# Patient Record
Sex: MALE | Race: BLACK OR AFRICAN AMERICAN | Employment: UNEMPLOYED | URBAN - METROPOLITAN AREA
[De-identification: names, ages, dates, MRNs, and addresses within clinical notes are randomized per-mention and may not be internally consistent; named-entity substitution may affect disease eponyms.]

---

## 2018-07-27 ENCOUNTER — ED HISTORICAL/CONVERTED ENCOUNTER (OUTPATIENT)
Dept: OTHER | Age: 53
End: 2018-07-27

## 2022-10-17 ENCOUNTER — HOSPITAL ENCOUNTER (OUTPATIENT)
Age: 57
Discharge: HOME | End: 2022-10-17
Attending: INTERNAL MEDICINE | Admitting: INTERNAL MEDICINE
Payer: COMMERCIAL

## 2022-10-17 VITALS
DIASTOLIC BLOOD PRESSURE: 83 MMHG | HEIGHT: 68 IN | HEART RATE: 67 BPM | TEMPERATURE: 98.1 F | OXYGEN SATURATION: 100 % | SYSTOLIC BLOOD PRESSURE: 133 MMHG | WEIGHT: 150 LBS | BODY MASS INDEX: 22.73 KG/M2 | RESPIRATION RATE: 16 BRPM

## 2022-10-17 DIAGNOSIS — R07.89 OTHER CHEST PAIN: ICD-10-CM

## 2022-10-17 PROBLEM — R07.9 CHEST PAIN: Status: ACTIVE | Noted: 2022-10-17

## 2022-10-17 LAB
ALBUMIN SERPL-MCNC: 4.4 G/DL (ref 3.5–5)
ALBUMIN/GLOB SERPL: 1.2 {RATIO} (ref 1.1–2.2)
ALP SERPL-CCNC: 76 U/L (ref 45–117)
ALT SERPL-CCNC: 20 U/L (ref 12–78)
ANION GAP SERPL CALC-SCNC: 3 MMOL/L (ref 5–15)
AST SERPL W P-5'-P-CCNC: ABNORMAL U/L (ref 15–37)
BASOPHILS # BLD: 0 K/UL (ref 0–0.1)
BASOPHILS NFR BLD: 1 % (ref 0–1)
BILIRUB SERPL-MCNC: 0.5 MG/DL (ref 0.2–1)
BUN SERPL-MCNC: 7 MG/DL (ref 6–20)
BUN/CREAT SERPL: 8 (ref 12–20)
CA-I BLD-MCNC: 9.5 MG/DL (ref 8.5–10.1)
CHLORIDE SERPL-SCNC: 103 MMOL/L (ref 97–108)
CO2 SERPL-SCNC: 32 MMOL/L (ref 21–32)
CREAT SERPL-MCNC: 0.83 MG/DL (ref 0.7–1.3)
DIFFERENTIAL METHOD BLD: NORMAL
EOSINOPHIL # BLD: 0.1 K/UL (ref 0–0.4)
EOSINOPHIL NFR BLD: 1 % (ref 0–7)
ERYTHROCYTE [DISTWIDTH] IN BLOOD BY AUTOMATED COUNT: 13.6 % (ref 11.5–14.5)
GLOBULIN SER CALC-MCNC: 3.6 G/DL (ref 2–4)
GLUCOSE SERPL-MCNC: 84 MG/DL (ref 65–100)
HCT VFR BLD AUTO: 43.8 % (ref 36.6–50.3)
HGB BLD-MCNC: 14.2 G/DL (ref 12.1–17)
IMM GRANULOCYTES # BLD AUTO: 0 K/UL (ref 0–0.04)
IMM GRANULOCYTES NFR BLD AUTO: 0 % (ref 0–0.5)
LYMPHOCYTES # BLD: 1.3 K/UL (ref 0.8–3.5)
LYMPHOCYTES NFR BLD: 32 % (ref 12–49)
MCH RBC QN AUTO: 29 PG (ref 26–34)
MCHC RBC AUTO-ENTMCNC: 32.4 G/DL (ref 30–36.5)
MCV RBC AUTO: 89.4 FL (ref 80–99)
MONOCYTES # BLD: 0.3 K/UL (ref 0–1)
MONOCYTES NFR BLD: 6 % (ref 5–13)
NEUTS SEG # BLD: 2.5 K/UL (ref 1.8–8)
NEUTS SEG NFR BLD: 60 % (ref 32–75)
NRBC # BLD: 0 K/UL (ref 0–0.01)
NRBC BLD-RTO: 0 PER 100 WBC
PLATELET # BLD AUTO: 170 K/UL (ref 150–400)
PMV BLD AUTO: 11.8 FL (ref 8.9–12.9)
POTASSIUM SERPL-SCNC: ABNORMAL MMOL/L (ref 3.5–5.1)
PROT SERPL-MCNC: 8 G/DL (ref 6.4–8.2)
RBC # BLD AUTO: 4.9 M/UL (ref 4.1–5.7)
SODIUM SERPL-SCNC: 138 MMOL/L (ref 136–145)
WBC # BLD AUTO: 4.1 K/UL (ref 4.1–11.1)

## 2022-10-17 PROCEDURE — 36415 COLL VENOUS BLD VENIPUNCTURE: CPT

## 2022-10-17 PROCEDURE — 76210000027 HC REC RM PH II 3.5 TO 4 HR: Performed by: INTERNAL MEDICINE

## 2022-10-17 PROCEDURE — 93458 L HRT ARTERY/VENTRICLE ANGIO: CPT | Performed by: INTERNAL MEDICINE

## 2022-10-17 PROCEDURE — 77030004550 HC CATH ANGI DX PRF MRTM -B: Performed by: INTERNAL MEDICINE

## 2022-10-17 PROCEDURE — 80053 COMPREHEN METABOLIC PANEL: CPT

## 2022-10-17 PROCEDURE — 77030004522 HC CATH ANGI DX EXPO BSC -A: Performed by: INTERNAL MEDICINE

## 2022-10-17 PROCEDURE — 74011250636 HC RX REV CODE- 250/636: Performed by: INTERNAL MEDICINE

## 2022-10-17 PROCEDURE — 99152 MOD SED SAME PHYS/QHP 5/>YRS: CPT | Performed by: INTERNAL MEDICINE

## 2022-10-17 PROCEDURE — 85025 COMPLETE CBC W/AUTO DIFF WBC: CPT

## 2022-10-17 PROCEDURE — C1760 CLOSURE DEV, VASC: HCPCS | Performed by: INTERNAL MEDICINE

## 2022-10-17 PROCEDURE — 74011000250 HC RX REV CODE- 250: Performed by: INTERNAL MEDICINE

## 2022-10-17 PROCEDURE — 74011000636 HC RX REV CODE- 636: Performed by: INTERNAL MEDICINE

## 2022-10-17 PROCEDURE — C1769 GUIDE WIRE: HCPCS | Performed by: INTERNAL MEDICINE

## 2022-10-17 PROCEDURE — C1894 INTRO/SHEATH, NON-LASER: HCPCS | Performed by: INTERNAL MEDICINE

## 2022-10-17 PROCEDURE — 99153 MOD SED SAME PHYS/QHP EA: CPT | Performed by: INTERNAL MEDICINE

## 2022-10-17 PROCEDURE — 76210000006 HC OR PH I REC 0.5 TO 1 HR: Performed by: INTERNAL MEDICINE

## 2022-10-17 RX ORDER — DIPHENHYDRAMINE HYDROCHLORIDE 50 MG/ML
INJECTION, SOLUTION INTRAMUSCULAR; INTRAVENOUS AS NEEDED
Status: DISCONTINUED | OUTPATIENT
Start: 2022-10-17 | End: 2022-10-17 | Stop reason: HOSPADM

## 2022-10-17 RX ORDER — TIZANIDINE HYDROCHLORIDE 4 MG/1
4 CAPSULE, GELATIN COATED ORAL 3 TIMES DAILY
COMMUNITY

## 2022-10-17 RX ORDER — MIDAZOLAM HYDROCHLORIDE 1 MG/ML
INJECTION INTRAMUSCULAR; INTRAVENOUS AS NEEDED
Status: DISCONTINUED | OUTPATIENT
Start: 2022-10-17 | End: 2022-10-17 | Stop reason: HOSPADM

## 2022-10-17 RX ORDER — SODIUM CHLORIDE 0.9 % (FLUSH) 0.9 %
5-40 SYRINGE (ML) INJECTION AS NEEDED
Status: DISCONTINUED | OUTPATIENT
Start: 2022-10-17 | End: 2022-10-17 | Stop reason: HOSPADM

## 2022-10-17 RX ORDER — PREDNISONE 10 MG/1
10 TABLET ORAL
COMMUNITY

## 2022-10-17 RX ORDER — FINASTERIDE 5 MG/1
5 TABLET, FILM COATED ORAL DAILY
COMMUNITY

## 2022-10-17 RX ORDER — ISOSORBIDE MONONITRATE 30 MG/1
30 TABLET, EXTENDED RELEASE ORAL DAILY
COMMUNITY

## 2022-10-17 RX ORDER — IBUPROFEN 200 MG
CAPSULE ORAL
COMMUNITY

## 2022-10-17 RX ORDER — HEPARIN SODIUM 200 [USP'U]/100ML
INJECTION, SOLUTION INTRAVENOUS
Status: COMPLETED | OUTPATIENT
Start: 2022-10-17 | End: 2022-10-17

## 2022-10-17 RX ORDER — FENTANYL CITRATE 50 UG/ML
INJECTION, SOLUTION INTRAMUSCULAR; INTRAVENOUS AS NEEDED
Status: DISCONTINUED | OUTPATIENT
Start: 2022-10-17 | End: 2022-10-17 | Stop reason: HOSPADM

## 2022-10-17 RX ORDER — SODIUM CHLORIDE 0.9 % (FLUSH) 0.9 %
5-40 SYRINGE (ML) INJECTION EVERY 8 HOURS
Status: DISCONTINUED | OUTPATIENT
Start: 2022-10-17 | End: 2022-10-17 | Stop reason: HOSPADM

## 2022-10-17 RX ORDER — LIDOCAINE HYDROCHLORIDE 10 MG/ML
INJECTION INFILTRATION; PERINEURAL AS NEEDED
Status: DISCONTINUED | OUTPATIENT
Start: 2022-10-17 | End: 2022-10-17 | Stop reason: HOSPADM

## 2022-10-17 RX ORDER — SODIUM CHLORIDE 9 MG/ML
50 INJECTION, SOLUTION INTRAVENOUS CONTINUOUS
Status: DISCONTINUED | OUTPATIENT
Start: 2022-10-17 | End: 2022-10-17 | Stop reason: HOSPADM

## 2022-10-17 RX ADMIN — SODIUM CHLORIDE 50 ML/HR: 9 INJECTION, SOLUTION INTRAVENOUS at 07:59

## 2022-10-17 NOTE — Clinical Note
TRANSFER - OUT REPORT:     Verbal report given to: nilsa macias , (at bedside). Report consisted of patient's Situation, Background, Assessment and   Recommendations(SBAR). Opportunity for questions and clarification was provided. Patient transported with a Registered Nurse. Patient transported to: [facility].

## 2022-10-17 NOTE — DISCHARGE INSTRUCTIONS
[facility]  Cardiac Catheterization Department  [address], [address]  [phone]        Coronary Angiogram: What to Expect at [address] [facility]  A coronary angiogram is a test to examine the large blood vessels of your heart (coronary arteries). The doctor inserted a thin, flexible tube (catheter into a blood vessel in your groin or wrist.  Your groin or wrist may have a bruise and feel sore for a few days after the procedure. You can do light activities around the house. But do not do anything strenuous until your doctor says it is ok. This may be for several days. This care sheet gives you a general idea about how long it will take for you to recover. But each person recovers at a different pace. Follow the steps below to feel better as quickly as possible. How can you care for yourself at home? Activity  If the doctor gave you a sedative: For 24 hours, don't do anything that requires attention to detail, such as going to work, making important decisions, or signing any legal documents. It takes time for the medicine's effects to completely wear off. For your safety, do not drive or operate any machinery that could be dangerous. Wait until the medicine wears off and you can think clearly and react easily. Do not do any strenuous exercise and do not lift, pull, or push anything heavier than 5 pounds (approximately the weight of 1 gallon of milk) until your doctor says it is ok. This may be for several days. You can walk around the house and do light activity, such as cooking. If the catheter was placed in your groin and you must use stairs, just go up and down slowly in as few trips as possible for the first couple of days. If the catheter was placed in your wrist, do not bend your wrist deeply for the first couple of days. A soft wrist-splint may be placed on your wrist as a reminder following the procedure.   Be careful using your hand to get into and out of a chair or bed and when opening doors. Get regular exercise, after being cleared by your cardiologist.  Walking may be a good choice. Try for at least 30 minutes on most days of the week. If you smoke or use smokeless tobacco products, including vaping products, it is extremely important that you quit using these products. Please ask you nurse or doctor for more information about smoking cessation. Diet  Drink plenty of fluids to help you body flush out the dye. If you have kidney, heart, or liver disease and have to limit fluids, talk to your doctor before increasing the amount of fluids you drink. Keep eating a heart-healthy diet that has lots of fruits, vegetables, and whole grains. If you have not been eating this way, talk to your doctor. You also may want to talk to a dietician. This expert can help you to learn about healthy foods and plan meals. Medicines  Your doctor will tell you if and when you can restart your medicines. You will also be given instructions about taking any new medicines. Take these medicines as prescribed. Continue taking your cholesterol lowering medications (statins), if you have been prescribed this. You doctor may prescribe a blood-thinning medicine like aspirin or clopidogrel (Plavix), plasugrel (Effient), or ticagrelor (Brilinta). If you had angioplasty or a stent placement, you must continue aspirin and Plavix, Effient, or Brilinta. It is very important that you take these medicines exactly as directed to keep the coronary artery open and reduce your risk of heart attack. Be safe with medicines. Call your doctor if you think you are having a problem with taking or obtaining your medicines, notify your doctor immediately. You cannot afford to miss your medications. Do not stop taking these medications without speaking to your cardiologist first.   Do not strain to have a bowel movement.   Ask your doctor if you feel you may need a stool softener of laxative. Care of the catheter site  For 1 or 2 days, keep a bandage over the spot where the catheter(s) was inserted. The bandage probably will fall off in this time. Put ice or a cold pack on the area for 10 to 20 minutes at a time to help with soreness of swelling. Place a thin cloth between the ice and your skin. You may shower 24 to 48 hours after the procedure, if your doctor says it is okay. Pat the incision dry with a clean towel afterwards. Do not soak the catheter site until it is healed. Don't take a bath for 1 week, or until your doctor tells you it is okay to do so. The use of lotions and powders is not recommended at the site until it is completely healed. Watch for bleeding from the site. A small amount of blood (up to the size of a quarter) on the bandage can be normal.  If you cough, sneeze, or laugh vigorously, apply direct pressure with your hand over the catheter site. If you notice a little bit of blood from the catheter site (similar to a paper cut or shaving nick), lie down and press firmly on the area for 15 minutes to try and make it stop bleeding. If the bleeding does not stop, call your doctor or seek immediate medical care. If you notice heavy bleeding at the site that has either saturated the bandage or is squirting, lie down, press firmly on the site, and have someone call 911. Follow-up care is a key part of your treatment and safety. Be sure to make and go to all appointments and call your doctor if you are having problems. It's also a good idea to know your test results and keep a list of medicines you take. When should you call for help? Call 911 anytime you think you may need emergency care. For example, call if:  You passed out (lost consciousness). You have severe trouble breathing. You have sudden chest pain and shortness of breath, or you cough up blood.   Call 911 if you have symptoms of a heart attack, such as:  Chest Pain, pressure, squeezing, or burning. Sweating. Shortness of breath or difficulty breathing. Nausea or vomiting. Jaw pain, shoulder pain, or arm pain in one or both sides. Feelings of fullness. Excessive fatigue or weakness. New onset anxiety. New onset of upper back pain. Dizziness or lightheadedness. A fast or uneven pulse. Call 911 if you have been diagnosed with angina, and you have symptoms that do not go away with rest or are not getting better within 5 minutes of taking a dose of your nitroglycerin. After you call 911, the  may tell you to chew 1 adult-strength (325 mg) of 2 to 4 low-dose aspirin (81 mg). Wait for ambulance. Do not drive yourself. Call your doctor now or seek immediate medical care if:  You are bleeding from the area where the catheter was inserted. You have a fast-growing, painful lump at the catheter site. You have signs of infection, such as  Increased pain, swelling, warmth or redness at the catheter site. Red streaks leading from the catheter site. Pus draining from the catheter site. A fever. Your leg or hand is painful, looks blue, or feels cold, numb, or tingly. Watch closely for changes in your health and be sure to contact your doctor if you have any problems.

## 2022-10-17 NOTE — Clinical Note
Contrast Dose Calculator:   Patient's age: 61.   Patient's sex: Male. Patient weight (kg) = 68. Creatinine level (mg/dL) = 0.83. Creatinine clearance (mL/min): 93.31. Contrast concentration (mg/mL) = 370. MACD = 300 mL. Max Contrast dose per Creatinine Cl calculator = 209.94 mL.

## 2022-10-17 NOTE — CONSULTS
Consult    NAME: Darren Galaviz   :  1965   MRN:  [id number]     Date/Time:  10/17/2022 9:26 AM    Patient PCP: None, Other, MD  ________________________________________________________________________      Subjective:   CHIEF COMPLAINT: Chest tightness. HISTORY OF PRESENT ILLNESS:   Patient with symptoms of chest tightness which is progressively deteriorating over a span of 1 year. He describes in the retrosternal area and gets relieved with rest.  He did not had any nausea or vomiting but he got more and more concerned and he stopped doing any activity. He has a low pulse and dyslipidemia and hypertension and his symptoms were concerning for angina and cardiac catheterization has been scheduled. Denied any history of heart attack. Past Medical History:   Diagnosis Date    Autoimmune disease ([address]     Lupus    BPH (benign prostatic hyperplasia)     Psychiatric disorder     Anxiety and depression      History reviewed. No pertinent surgical history. Allergies   Allergen Reactions    Penicillin Other (comments)     Pt stated he got \"thrush\" the last time he had the med in       Meds:  See below  Social History     Tobacco Use    Smoking status: Former     Types: Cigarettes    Smokeless tobacco: Never   Substance Use Topics    Alcohol use: Not Currently   Continues to smoke half pack a day but quit 20 years ago and used to drink heavily but he has quit many years ago and he used marijuana heroin cocaine and IV drugs and she had and he said he used everything available to him but he is incarcerated and he does not use any longer for quite some time. History reviewed. No pertinent family history. FAMILY HISTORY: Mother is 78years old and apparently in reasonable health and he has no idea about his father. PERSONAL HISTORY : Patient is single and has no children and has been incarcerated since very young age and he did not work ever.       REVIEW OF SYSTEMS:     [] Unable to obtain  ROS due to ---   [x]         Total of 12 systems reviewed as follows:    Constitutional: negative fever, negative chills, negative weight loss  Eyes:   negative visual changes  ENT:   negative sore throat, tongue or lip swelling  Respiratory:  negative cough, positive for dyspnea on exertion  Cards: Significant for for chest pain, denied palpitations, lower extremity edema  GI:   negative for nausea, vomiting, diarrhea, and abdominal pain  Genitourinary: negative for frequency, dysuria  Integument:  negative for rash   Hematologic:  negative for easy bruising and gum/nose bleeding  Musculoskel: negative for myalgias,  back pain  Neurological:  negative for headaches, dizziness, vertigo, weakness  Behavl/Psych: negative for feelings of anxiety, depression     Pertinent Positives include :    Objective:      Physical Exam:    Last 24hrs VS reviewed since prior progress note. Most recent are:    Visit Vitals  BP (!) 151/85 (BP 1 Location: Left upper arm, BP Patient Position: At rest)   Pulse 66   Temp 98.7 °F (37.1 °C)   Resp 16   Ht 5' 8\" (1.727 m)   Wt 68 kg (150 lb)   SpO2 100%   BMI 22.81 kg/m²     No intake or output data in the 24 hours ending 10/17/22 0926     General Appearance: Well developed, well nourished, alert & oriented x 3,    no acute distress. Ears/Nose/Mouth/Throat: Pupils equal and round, Hearing grossly normal.  Neck: Supple. JVP within normal limits. Carotids good upstrokes, with no bruit. Chest: Lungs clear to auscultation bilaterally. Cardiovascular: Regular rate and rhythm, S1S2 normal, no murmur, rubs, gallops. Abdomen: Soft, non-tender, bowel sounds are active. No organomegaly. Extremities: No edema bilaterally. Femoral pulses +2, Distal Pulses +1. Skin: Warm and dry. Neuro: CN II-XII grossly intact, Strength and sensation grossly intact. []         Post-cath site without hematoma, bruit, tenderness, or thrill. Distal pulses intact.     Data:      Telemetry: Normal sinus rhythm    EKG:  []  No new EKG for review. Prior to Admission medications    Medication Sig Start Date End Date Taking? Authorizing Provider   finasteride (PROSCAR) 5 mg tablet Take 5 mg by mouth daily. Yes Provider, Historical   isosorbide mononitrate ER (IMDUR) 30 mg tablet Take 30 mg by mouth daily. Yes Provider, Historical   predniSONE (DELTASONE) 10 mg tablet Take 10 mg by mouth daily (with breakfast). Yes Provider, Historical   ibuprofen 200 mg cap Take  by mouth. Yes Provider, Historical   tiZANidine (ZANAFLEX) 4 mg capsule Take 4 mg by mouth three (3) times daily. Yes Provider, Historical       Recent Results (from the past 24 hour(s))   METABOLIC PANEL, COMPREHENSIVE    Collection Time: 10/17/22  7:54 AM   Result Value Ref Range    Sodium 138 136 - 145 mmol/L    Potassium Hemolyzed, recollect requested 3.5 - 5.1 mmol/L    Chloride 103 97 - 108 mmol/L    CO2 32 21 - 32 mmol/L    Anion gap 3 (L) 5 - 15 mmol/L    Glucose 84 65 - 100 mg/dL    BUN 7 6 - 20 mg/dL    Creatinine 0.83 0.70 - 1.30 mg/dL    BUN/Creatinine ratio 8 (L) 12 - 20      eGFR >60 >60 ml/min/1.73m2    Calcium 9.5 8.5 - 10.1 mg/dL    Bilirubin, total 0.5 0.2 - 1.0 mg/dL    AST (SGOT) Hemolyzed, recollect requested 15 - 37 U/L    ALT (SGPT) 20 12 - 78 U/L    Alk. phosphatase 76 45 - 117 U/L    Protein, total 8.0 6.4 - 8.2 g/dL    Albumin 4.4 3.5 - 5.0 g/dL    Globulin 3.6 2.0 - 4.0 g/dL    A-G Ratio 1.2 1.1 - 2.2     CBC WITH AUTOMATED DIFF    Collection Time: 10/17/22  7:54 AM   Result Value Ref Range    WBC 4.1 4.1 - 11.1 K/uL    RBC 4.90 4. 10 - 5.70 M/uL    HGB 14.2 12.1 - 17.0 g/dL    HCT 43.8 36.6 - 50.3 %    MCV 89.4 80.0 - 99.0 FL    MCH 29.0 26.0 - 34.0 PG    MCHC 32.4 30.0 - 36.5 g/dL    RDW 13.6 11.5 - 14.5 %    PLATELET 926 526 - 515 K/uL    MPV 11.8 8.9 - 12.9 FL    NRBC 0.0 0.0  WBC    ABSOLUTE NRBC 0.00 0.00 - 0.01 K/uL    NEUTROPHILS 60 32 - 75 %    LYMPHOCYTES 32 12 - 49 %    MONOCYTES 6 5 - 13 %    EOSINOPHILS 1 0 - 7 %    BASOPHILS 1 0 - 1 %    IMMATURE GRANULOCYTES 0 0 - 0.5 %    ABS. NEUTROPHILS 2.5 1.8 - 8.0 K/UL    ABS. LYMPHOCYTES 1.3 0.8 - 3.5 K/UL    ABS. MONOCYTES 0.3 0.0 - 1.0 K/UL    ABS. EOSINOPHILS 0.1 0.0 - 0.4 K/UL    ABS. BASOPHILS 0.0 0.0 - 0.1 K/UL    ABS. IMM. GRANS. 0.0 0.00 - 0.04 K/UL    DF AUTOMATED           Assessment:   Exertional chest pain and exertional dyspnea is concerning for progressive angina. Hypertension. Lupus. History of substance abuse and IV drug abuse in the past.  Dyslipidemia. Plan:   Patient is scheduled for cardiac catheterization and possible angioplasty stent insertion. Procedure and risk benefit discussed with the patient including risk of vascular injury, hematoma, need for transfusion, contrast related nephropathy, cardiac arrhythmias, myocardial infarction etc. explained and answered him all the questions in this regard. He understands and agrees and will proceed. Thank you.         []        High complexity decision making was performed    Rosa Conn MD

## 2022-10-17 NOTE — PERIOP NOTES
Discharge instructions/education provided to ESTEBAN Chang LPN in medical at facility, she verbalized understanding and had no questions. Patient discharged in wheelchair with security with 2 guards back to facility.

## 2024-01-11 ENCOUNTER — TRANSCRIBE ORDERS (OUTPATIENT)
Facility: HOSPITAL | Age: 59
End: 2024-01-11

## 2024-01-11 DIAGNOSIS — R10.11 RUQ ABDOMINAL PAIN: Primary | ICD-10-CM

## 2024-02-21 ENCOUNTER — HOSPITAL ENCOUNTER (OUTPATIENT)
Facility: HOSPITAL | Age: 59
Discharge: HOME | End: 2024-02-24
Payer: COMMERCIAL

## 2024-02-21 DIAGNOSIS — R10.11 RUQ ABDOMINAL PAIN: ICD-10-CM

## 2024-02-21 PROCEDURE — 76705 ECHO EXAM OF ABDOMEN: CPT

## 2024-09-23 ENCOUNTER — HOSPITAL ENCOUNTER (INPATIENT)
Facility: HOSPITAL | Age: 59
LOS: 3 days | Discharge: HOME | DRG: 093 | End: 2024-09-26
Attending: STUDENT IN AN ORGANIZED HEALTH CARE EDUCATION/TRAINING PROGRAM | Admitting: FAMILY MEDICINE
Payer: COMMERCIAL

## 2024-09-23 ENCOUNTER — APPOINTMENT (OUTPATIENT)
Facility: HOSPITAL | Age: 59
DRG: 093 | End: 2024-09-23
Payer: COMMERCIAL

## 2024-09-23 ENCOUNTER — APPOINTMENT (OUTPATIENT)
Facility: HOSPITAL | Age: 59
DRG: 093 | End: 2024-09-23
Attending: FAMILY MEDICINE
Payer: COMMERCIAL

## 2024-09-23 DIAGNOSIS — R07.9 CHEST PAIN, UNSPECIFIED TYPE: ICD-10-CM

## 2024-09-23 DIAGNOSIS — R20.0 NUMBNESS: Primary | ICD-10-CM

## 2024-09-23 PROBLEM — I61.9 CVA (CEREBROVASCULAR ACCIDENT DUE TO INTRACEREBRAL HEMORRHAGE) (HCC): Status: ACTIVE | Noted: 2024-09-23

## 2024-09-23 LAB
ALBUMIN SERPL-MCNC: 3.7 G/DL (ref 3.5–5)
ALBUMIN/GLOB SERPL: 1.1 (ref 1.1–2.2)
ALP SERPL-CCNC: 58 U/L (ref 45–117)
ALT SERPL-CCNC: 17 U/L (ref 12–78)
ANION GAP SERPL CALC-SCNC: 4 MMOL/L (ref 2–12)
AST SERPL W P-5'-P-CCNC: 23 U/L (ref 15–37)
BASOPHILS # BLD: 0 K/UL (ref 0–0.1)
BASOPHILS NFR BLD: 1 % (ref 0–1)
BILIRUB SERPL-MCNC: 0.6 MG/DL (ref 0.2–1)
BUN SERPL-MCNC: 7 MG/DL (ref 6–20)
BUN/CREAT SERPL: 9 (ref 12–20)
CA-I BLD-MCNC: 8.6 MG/DL (ref 8.5–10.1)
CHLORIDE SERPL-SCNC: 112 MMOL/L (ref 97–108)
CO2 SERPL-SCNC: 26 MMOL/L (ref 21–32)
CREAT SERPL-MCNC: 0.76 MG/DL (ref 0.7–1.3)
DIFFERENTIAL METHOD BLD: ABNORMAL
ECHO AO ASC DIAM: 3.3 CM
ECHO AO ASCENDING AORTA INDEX: 1.83 CM/M2
ECHO AO ROOT DIAM: 3.4 CM
ECHO AO ROOT INDEX: 1.89 CM/M2
ECHO AV AREA PEAK VELOCITY: 2.2 CM2
ECHO AV AREA/BSA PEAK VELOCITY: 1.2 CM2/M2
ECHO AV PEAK GRADIENT: 7 MMHG
ECHO AV PEAK VELOCITY: 1.4 M/S
ECHO AV VELOCITY RATIO: 0.71
ECHO BSA: 1.85 M2
ECHO IVC EXP: 1.3 CM
ECHO LA AREA 2C: 15.9 CM2
ECHO LA AREA 4C: 17.5 CM2
ECHO LA DIAMETER INDEX: 1.67 CM/M2
ECHO LA DIAMETER: 3 CM
ECHO LA MAJOR AXIS: 6 CM
ECHO LA MINOR AXIS: 5 CM
ECHO LA TO AORTIC ROOT RATIO: 0.88
ECHO LA VOL BP: 42 ML (ref 18–58)
ECHO LA VOL MOD A2C: 41 ML (ref 18–58)
ECHO LA VOL MOD A4C: 38 ML (ref 18–58)
ECHO LA VOL/BSA BIPLANE: 23 ML/M2 (ref 16–34)
ECHO LA VOLUME INDEX MOD A2C: 23 ML/M2 (ref 16–34)
ECHO LA VOLUME INDEX MOD A4C: 21 ML/M2 (ref 16–34)
ECHO LV E' LATERAL VELOCITY: 14.8 CM/S
ECHO LV E' SEPTAL VELOCITY: 10.4 CM/S
ECHO LV EDV 3D: 132 ML
ECHO LV EDV A2C: 94 ML
ECHO LV EDV A4C: 105 ML
ECHO LV EDV INDEX 3D: 73 ML/M2
ECHO LV EDV INDEX A4C: 58 ML/M2
ECHO LV EDV NDEX A2C: 52 ML/M2
ECHO LV EF PHYSICIAN: 65 %
ECHO LV EJECTION FRACTION 3D: 59 %
ECHO LV EJECTION FRACTION A2C: 71 %
ECHO LV EJECTION FRACTION A4C: 59 %
ECHO LV EJECTION FRACTION BIPLANE: 65 % (ref 55–100)
ECHO LV ESV 3D: 54 ML
ECHO LV ESV A2C: 27 ML
ECHO LV ESV A4C: 43 ML
ECHO LV ESV INDEX 3D: 30 ML/M2
ECHO LV ESV INDEX A2C: 15 ML/M2
ECHO LV ESV INDEX A4C: 24 ML/M2
ECHO LV FRACTIONAL SHORTENING: 51 % (ref 28–44)
ECHO LV INTERNAL DIMENSION DIASTOLE INDEX: 2.39 CM/M2
ECHO LV INTERNAL DIMENSION DIASTOLIC: 4.3 CM (ref 4.2–5.9)
ECHO LV INTERNAL DIMENSION SYSTOLIC INDEX: 1.17 CM/M2
ECHO LV INTERNAL DIMENSION SYSTOLIC: 2.1 CM
ECHO LV IVSD: 1.3 CM (ref 0.6–1)
ECHO LV MASS 2D: 152.6 G (ref 88–224)
ECHO LV MASS 3D INDEX: 88.9 G/M2
ECHO LV MASS 3D: 160 G
ECHO LV MASS INDEX 2D: 84.8 G/M2 (ref 49–115)
ECHO LV POSTERIOR WALL DIASTOLIC: 0.8 CM (ref 0.6–1)
ECHO LV RELATIVE WALL THICKNESS RATIO: 0.37
ECHO LVOT AREA: 2.8 CM2
ECHO LVOT DIAM: 1.9 CM
ECHO LVOT PEAK GRADIENT: 4 MMHG
ECHO LVOT PEAK VELOCITY: 1 M/S
ECHO MV A VELOCITY: 0.59 M/S
ECHO MV E DECELERATION TIME (DT): 201 MS
ECHO MV E VELOCITY: 0.63 M/S
ECHO MV E/A RATIO: 1.07
ECHO MV E/E' LATERAL: 4.26
ECHO MV E/E' RATIO (AVERAGED): 5.16
ECHO MV E/E' SEPTAL: 6.06
ECHO MV REGURGITANT PEAK GRADIENT: 13 MMHG
ECHO MV REGURGITANT PEAK VELOCITY: 1.8 M/S
ECHO PV ACCELERATION TIME (AT): 106 MS
ECHO PV MAX VELOCITY: 0.9 M/S
ECHO PV PEAK GRADIENT: 3 MMHG
ECHO RA AREA 4C: 15.6 CM2
ECHO RA END SYSTOLIC VOLUME APICAL 4 CHAMBER INDEX BSA: 22 ML/M2
ECHO RA VOLUME: 40 ML
ECHO RV BASAL DIMENSION: 3.9 CM
ECHO RV FREE WALL PEAK S': 14.8 CM/S
ECHO RV INTERNAL DIMENSION: 3 CM
ECHO RV TAPSE: 2 CM (ref 1.7–?)
ECHO TV REGURGITANT MAX VELOCITY: 2.12 M/S
ECHO TV REGURGITANT PEAK GRADIENT: 18 MMHG
EKG ATRIAL RATE: 67 BPM
EKG DIAGNOSIS: NORMAL
EKG P AXIS: 56 DEGREES
EKG P-R INTERVAL: 128 MS
EKG Q-T INTERVAL: 386 MS
EKG QRS DURATION: 68 MS
EKG QTC CALCULATION (BAZETT): 407 MS
EKG R AXIS: 41 DEGREES
EKG T AXIS: 39 DEGREES
EKG VENTRICULAR RATE: 67 BPM
EOSINOPHIL # BLD: 0.1 K/UL (ref 0–0.4)
EOSINOPHIL NFR BLD: 2 % (ref 0–7)
ERYTHROCYTE [DISTWIDTH] IN BLOOD BY AUTOMATED COUNT: 12.8 % (ref 11.5–14.5)
GLOBULIN SER CALC-MCNC: 3.4 G/DL (ref 2–4)
GLUCOSE BLD STRIP.AUTO-MCNC: 115 MG/DL (ref 65–100)
GLUCOSE SERPL-MCNC: 93 MG/DL (ref 65–100)
HCT VFR BLD AUTO: 41 % (ref 36.6–50.3)
HGB BLD-MCNC: 13.4 G/DL (ref 12.1–17)
IMM GRANULOCYTES # BLD AUTO: 0 K/UL (ref 0–0.04)
IMM GRANULOCYTES NFR BLD AUTO: 0 % (ref 0–0.5)
INR PPP: 1 (ref 0.9–1.1)
LYMPHOCYTES # BLD: 0.9 K/UL (ref 0.8–3.5)
LYMPHOCYTES NFR BLD: 28 % (ref 12–49)
MCH RBC QN AUTO: 29.3 PG (ref 26–34)
MCHC RBC AUTO-ENTMCNC: 32.7 G/DL (ref 30–36.5)
MCV RBC AUTO: 89.5 FL (ref 80–99)
MONOCYTES # BLD: 0.3 K/UL (ref 0–1)
MONOCYTES NFR BLD: 8 % (ref 5–13)
NEUTS SEG # BLD: 2.1 K/UL (ref 1.8–8)
NEUTS SEG NFR BLD: 61 % (ref 32–75)
NRBC # BLD: 0 K/UL (ref 0–0.01)
NRBC BLD-RTO: 0 PER 100 WBC
PERFORMED BY:: ABNORMAL
PLATELET # BLD AUTO: 148 K/UL (ref 150–400)
PMV BLD AUTO: 11.7 FL (ref 8.9–12.9)
POTASSIUM SERPL-SCNC: 4.4 MMOL/L (ref 3.5–5.1)
PROT SERPL-MCNC: 7.1 G/DL (ref 6.4–8.2)
PROTHROMBIN TIME: 13.5 SEC (ref 11.9–14.6)
RBC # BLD AUTO: 4.58 M/UL (ref 4.1–5.7)
SODIUM SERPL-SCNC: 142 MMOL/L (ref 136–145)
TROPONIN I SERPL HS-MCNC: 5 NG/L (ref 0–76)
WBC # BLD AUTO: 3.3 K/UL (ref 4.1–11.1)

## 2024-09-23 PROCEDURE — 85025 COMPLETE CBC W/AUTO DIFF WBC: CPT

## 2024-09-23 PROCEDURE — 6370000000 HC RX 637 (ALT 250 FOR IP): Performed by: FAMILY MEDICINE

## 2024-09-23 PROCEDURE — 99285 EMERGENCY DEPT VISIT HI MDM: CPT

## 2024-09-23 PROCEDURE — 36415 COLL VENOUS BLD VENIPUNCTURE: CPT

## 2024-09-23 PROCEDURE — 70450 CT HEAD/BRAIN W/O DYE: CPT

## 2024-09-23 PROCEDURE — 85610 PROTHROMBIN TIME: CPT

## 2024-09-23 PROCEDURE — 82962 GLUCOSE BLOOD TEST: CPT

## 2024-09-23 PROCEDURE — 2580000003 HC RX 258: Performed by: FAMILY MEDICINE

## 2024-09-23 PROCEDURE — 6360000002 HC RX W HCPCS: Performed by: FAMILY MEDICINE

## 2024-09-23 PROCEDURE — 84484 ASSAY OF TROPONIN QUANT: CPT

## 2024-09-23 PROCEDURE — 80053 COMPREHEN METABOLIC PANEL: CPT

## 2024-09-23 PROCEDURE — 1100000000 HC RM PRIVATE

## 2024-09-23 PROCEDURE — 93306 TTE W/DOPPLER COMPLETE: CPT

## 2024-09-23 PROCEDURE — 93005 ELECTROCARDIOGRAM TRACING: CPT | Performed by: STUDENT IN AN ORGANIZED HEALTH CARE EDUCATION/TRAINING PROGRAM

## 2024-09-23 RX ORDER — ATORVASTATIN CALCIUM 40 MG/1
80 TABLET, FILM COATED ORAL NIGHTLY
Status: DISCONTINUED | OUTPATIENT
Start: 2024-09-23 | End: 2024-09-26 | Stop reason: HOSPADM

## 2024-09-23 RX ORDER — SODIUM CHLORIDE 9 MG/ML
INJECTION, SOLUTION INTRAVENOUS PRN
Status: DISCONTINUED | OUTPATIENT
Start: 2024-09-23 | End: 2024-09-26 | Stop reason: HOSPADM

## 2024-09-23 RX ORDER — ONDANSETRON 4 MG/1
4 TABLET, ORALLY DISINTEGRATING ORAL EVERY 8 HOURS PRN
Status: DISCONTINUED | OUTPATIENT
Start: 2024-09-23 | End: 2024-09-26 | Stop reason: HOSPADM

## 2024-09-23 RX ORDER — SODIUM CHLORIDE 0.9 % (FLUSH) 0.9 %
5-40 SYRINGE (ML) INJECTION PRN
Status: DISCONTINUED | OUTPATIENT
Start: 2024-09-23 | End: 2024-09-26 | Stop reason: HOSPADM

## 2024-09-23 RX ORDER — ASPIRIN 81 MG/1
81 TABLET, CHEWABLE ORAL DAILY
Status: DISCONTINUED | OUTPATIENT
Start: 2024-09-23 | End: 2024-09-26 | Stop reason: HOSPADM

## 2024-09-23 RX ORDER — ENOXAPARIN SODIUM 100 MG/ML
40 INJECTION SUBCUTANEOUS DAILY
Status: DISCONTINUED | OUTPATIENT
Start: 2024-09-23 | End: 2024-09-26 | Stop reason: HOSPADM

## 2024-09-23 RX ORDER — ASPIRIN 300 MG/1
300 SUPPOSITORY RECTAL DAILY
Status: DISCONTINUED | OUTPATIENT
Start: 2024-09-23 | End: 2024-09-26 | Stop reason: HOSPADM

## 2024-09-23 RX ORDER — ONDANSETRON 2 MG/ML
4 INJECTION INTRAMUSCULAR; INTRAVENOUS EVERY 6 HOURS PRN
Status: DISCONTINUED | OUTPATIENT
Start: 2024-09-23 | End: 2024-09-26 | Stop reason: HOSPADM

## 2024-09-23 RX ORDER — FINASTERIDE 5 MG/1
5 TABLET, FILM COATED ORAL DAILY
Status: CANCELLED | OUTPATIENT
Start: 2024-09-23

## 2024-09-23 RX ORDER — SODIUM CHLORIDE 0.9 % (FLUSH) 0.9 %
5-40 SYRINGE (ML) INJECTION EVERY 12 HOURS SCHEDULED
Status: DISCONTINUED | OUTPATIENT
Start: 2024-09-23 | End: 2024-09-26 | Stop reason: HOSPADM

## 2024-09-23 RX ORDER — POLYETHYLENE GLYCOL 3350 17 G/17G
17 POWDER, FOR SOLUTION ORAL DAILY PRN
Status: DISCONTINUED | OUTPATIENT
Start: 2024-09-23 | End: 2024-09-26 | Stop reason: HOSPADM

## 2024-09-23 RX ADMIN — ENOXAPARIN SODIUM 40 MG: 100 INJECTION SUBCUTANEOUS at 14:31

## 2024-09-23 RX ADMIN — ATORVASTATIN CALCIUM 80 MG: 40 TABLET, FILM COATED ORAL at 21:09

## 2024-09-23 RX ADMIN — ASPIRIN 81 MG 81 MG: 81 TABLET ORAL at 14:31

## 2024-09-23 RX ADMIN — SODIUM CHLORIDE, PRESERVATIVE FREE 10 ML: 5 INJECTION INTRAVENOUS at 21:10

## 2024-09-23 ASSESSMENT — LIFESTYLE VARIABLES
HOW MANY STANDARD DRINKS CONTAINING ALCOHOL DO YOU HAVE ON A TYPICAL DAY: PATIENT DOES NOT DRINK
HOW OFTEN DO YOU HAVE A DRINK CONTAINING ALCOHOL: NEVER

## 2024-09-23 ASSESSMENT — PAIN - FUNCTIONAL ASSESSMENT: PAIN_FUNCTIONAL_ASSESSMENT: NONE - DENIES PAIN

## 2024-09-23 NOTE — ED PROVIDER NOTES
[facility] EMERGENCY DEPT  EMERGENCY DEPARTMENT HISTORY AND PHYSICAL EXAM      Date: 9/23/2024  Patient Name: Eric Victoria  MRN: [id number]  Birthdate 1965  Date of evaluation: 9/23/2024  Provider: Luis Painter MD   Note Started: 12:44 PM EDT 9/23/24    HISTORY OF PRESENT ILLNESS     Chief Complaint   Patient presents with    Loss of Consciousness       History Provided By: Patient    HPI: Eric Victoria is a 59 y.o. male with PMH lupus who comes to the ED from a correctional facility due to new onset of numbness.  Numbness started 3 days ago and is at the right side.  He does not have any weakness.  No headache or change in vision, hearing, or speech or dizziness.  Patient denies any chest pain shortness of breath.  Patient also report that he had an episode of syncope earlier today.  He describes symptoms of numbness as well as to the right side of his body.  He did not have any other symptoms or concerns that he would like to share today.      PAST MEDICAL HISTORY   Past Medical History:  Past Medical History:   Diagnosis Date    Autoimmune disease (HCC)     Lupus    BPH (benign prostatic hyperplasia)     Psychiatric disorder     Anxiety and depression       Past Surgical History:  History reviewed. No pertinent surgical history.    Family History:  History reviewed. No pertinent family history.    Social History:  Social History     Tobacco Use    Smoking status: Former    Smokeless tobacco: Never   Substance Use Topics    Alcohol use: Not Currently    Drug use: Not Currently     Types: Marijuana (Weed), Cocaine, Methamphetamines (Crystal Meth), Opiates , Heroin       Allergies:  Allergies   Allergen Reactions    Penicillin G Other (See Comments)     Pt stated he got \"thrush\" the last time he had the med in 2008       PCP: Rolf Larkin MD    Current Meds:   Current Facility-Administered Medications   Medication Dose Route Frequency Provider Last Rate Last Admin    sodium chloride flush 0.9 % injection

## 2024-09-23 NOTE — ED NOTES
ED TO INPATIENT SBAR HANDOFF    Patient Name: Eric Victoria   Preferred Name: Eric  : 1965  59 y.o.   Family/Caregiver Present: no   Code Status Order: Full Code  PO Status: NPO:No  Telemetry Order:   C-SSRS: Risk of Suicide: No Risk  Sitter no  n/a  Restraints:     Sepsis Risk Score      Situation  Chief Complaint   Patient presents with    Loss of Consciousness     Brief Description of Patient's Condition: Numbness. Decreased sensation in R side of face/jaw, arm and leg  Mental Status: oriented, alert, coherent, logical, thought processes intact, and able to concentrate and follow conversation  Arrived from:Home  Imaging:   CT HEAD WO CONTRAST   Final Result   No acute intracranial hemorrhage, mass or infarct.          Electronically signed by Amish Granados MD      MRI brain without contrast    (Results Pending)   Vascular duplex carotid bilateral    (Results Pending)     Abnormal labs:   Abnormal Labs Reviewed   CBC WITH AUTO DIFFERENTIAL - Abnormal; Notable for the following components:       Result Value    WBC 3.3 (*)     Platelets 148 (*)     All other components within normal limits   COMPREHENSIVE METABOLIC PANEL - Abnormal; Notable for the following components:    Chloride 112 (*)     BUN/Creatinine Ratio 9 (*)     All other components within normal limits       Background  Allergies:   Allergies   Allergen Reactions    Penicillin G Other (See Comments)     Pt stated he got \"thrush\" the last time he had the med in      History:   Past Medical History:   Diagnosis Date    Autoimmune disease (HCC)     Lupus    BPH (benign prostatic hyperplasia)     Psychiatric disorder     Anxiety and depression       Assessment  Vitals: MEWS Score: 1  Level of Consciousness: Alert (0)   Vitals:    24 1513 24 1530 24 1540 24 1758   BP: 139/82 (!) 150/82 132/82 (!) 161/90   Pulse: 65 66 68 75   Resp:    18   Temp:    98.2 °F (36.8 °C)   TempSrc:    Oral   SpO2: 100% 100% 100% 100%  administration: n/a  Pertinent or High Risk Medications/Drips: no   If Yes, please provide details: n/a  Blood Product Administration: no  If Yes, please provide details: n/a  Process Protocols/Bundles: N/A    Recommendation  Incomplete STAT orders: n/a  Overdue Medications: n/a  Patient Belongings:    Additional Comments: n/a  If any further questions, please call Sending RN at [phone]      Admitting Unit Notification  Name of person notified and time: Servando @ [id number]      Electronically signed by: Electronically signed by Lesly Garcia RN on 9/23/2024 at 6:07 PM

## 2024-09-23 NOTE — ED TRIAGE NOTES
Arrives via [facility] with complaints of having syncoal episode around 0800 today, denies hitting head, -LOC, -thinners. Reports right before incident feeling \"weak\" and \"dizzy.\" Received 500cc NS in route. GCS 15. Hx lupus and BPH

## 2024-09-23 NOTE — ED NOTES
Pt reports right side of facial numbness, RUE and RLE numbness since this past Friday, 2 days ago. States also had bilateral vision blurriness that occurred along with the numbness. Dr. aPinter made aware

## 2024-09-23 NOTE — H&P
History and Physical    NAME:   Eric Victoria   :  1965   MRN:  [id number]     Date/Time: 2024 1:31 PM    Patient PCP: Rolf Larkin MD  ______________________________________________________________________       Subjective:     CHIEF COMPLAINT:     Right facial numbness        HISTORY OF PRESENT ILLNESS:     General Daily Progress Note  Patient is a 59 y.o. year old male history of lupus BPH came to emergency room from the correctional facility with facial numbness since Saturday not today can move towards no numbness in the hands arm no weakness no slurred speech no difficulty in swallowing seen by the ER physician patient have a CT scan of the head done which was negative teleneurology was consulted recommended patient to be admitted for further workup for stroke    Patient denies any chest pain shortness of breath nausea vomiting diarrhea constipation no fever no chills    Past Medical History:   Diagnosis Date    Autoimmune disease (HCC)     Lupus    BPH (benign prostatic hyperplasia)     Psychiatric disorder     Anxiety and depression        History reviewed. No pertinent surgical history.    Social History     Tobacco Use    Smoking status: Former    Smokeless tobacco: Never   Substance Use Topics    Alcohol use: Not Currently        History reviewed. No pertinent family history.    Allergies   Allergen Reactions    Penicillin G Other (See Comments)     Pt stated he got \"thrush\" the last time he had the med in         Prior to Admission medications    Medication Sig Start Date End Date Taking? Authorizing Provider   finasteride (PROSCAR) 5 MG tablet Take 1 tablet by mouth daily    Automatic Reconciliation, Ar   ibuprofen (ADVIL;MOTRIN) 200 MG CAPS capsule Take by mouth    Automatic Reconciliation, Ar   isosorbide mononitrate (IMDUR) 30 MG extended release tablet Take 1 tablet by mouth daily    Automatic Reconciliation, Ar   predniSONE (DELTASONE) 10 MG tablet Take 1 tablet by mouth daily  tablet 80 mg, 80 mg, Oral, Nightly, Tyra Sargent MD    aspirin chewable tablet 81 mg, 81 mg, Oral, Daily **OR** aspirin suppository 300 mg, 300 mg, Rectal, Daily, Tyra Sargent MD    Current Outpatient Medications:     finasteride (PROSCAR) 5 MG tablet, Take 1 tablet by mouth daily, Disp: , Rfl:     ibuprofen (ADVIL;MOTRIN) 200 MG CAPS capsule, Take by mouth, Disp: , Rfl:     isosorbide mononitrate (IMDUR) 30 MG extended release tablet, Take 1 tablet by mouth daily, Disp: , Rfl:     predniSONE (DELTASONE) 10 MG tablet, Take 1 tablet by mouth daily (with breakfast), Disp: , Rfl:     tiZANidine (ZANAFLEX) 4 MG capsule, Take 1 capsule by mouth 3 times daily, Disp: , Rfl:        ________________________________________________________________________    Signed: Tyra Sargent MD

## 2024-09-24 ENCOUNTER — APPOINTMENT (OUTPATIENT)
Facility: HOSPITAL | Age: 59
DRG: 093 | End: 2024-09-24
Attending: FAMILY MEDICINE
Payer: COMMERCIAL

## 2024-09-24 ENCOUNTER — APPOINTMENT (OUTPATIENT)
Facility: HOSPITAL | Age: 59
DRG: 093 | End: 2024-09-24
Payer: COMMERCIAL

## 2024-09-24 LAB
CHOLEST SERPL-MCNC: 132 MG/DL
ERYTHROCYTE [DISTWIDTH] IN BLOOD BY AUTOMATED COUNT: 13.1 % (ref 11.5–14.5)
EST. AVERAGE GLUCOSE BLD GHB EST-MCNC: 111 MG/DL
HBA1C MFR BLD: 5.5 % (ref 4–5.6)
HCT VFR BLD AUTO: 43.9 % (ref 36.6–50.3)
HDLC SERPL-MCNC: 56 MG/DL
HDLC SERPL: 2.4 (ref 0–5)
HGB BLD-MCNC: 14.5 G/DL (ref 12.1–17)
LDLC SERPL CALC-MCNC: 62.8 MG/DL (ref 0–100)
LIPID PANEL: NORMAL
MCH RBC QN AUTO: 29.3 PG (ref 26–34)
MCHC RBC AUTO-ENTMCNC: 33 G/DL (ref 30–36.5)
MCV RBC AUTO: 88.7 FL (ref 80–99)
NRBC # BLD: 0 K/UL (ref 0–0.01)
NRBC BLD-RTO: 0 PER 100 WBC
PLATELET # BLD AUTO: 158 K/UL (ref 150–400)
PMV BLD AUTO: 12.3 FL (ref 8.9–12.9)
RBC # BLD AUTO: 4.95 M/UL (ref 4.1–5.7)
TRIGL SERPL-MCNC: 66 MG/DL
VLDLC SERPL CALC-MCNC: 13.2 MG/DL
WBC # BLD AUTO: 3.4 K/UL (ref 4.1–11.1)

## 2024-09-24 PROCEDURE — 85027 COMPLETE CBC AUTOMATED: CPT

## 2024-09-24 PROCEDURE — 93880 EXTRACRANIAL BILAT STUDY: CPT

## 2024-09-24 PROCEDURE — 80061 LIPID PANEL: CPT

## 2024-09-24 PROCEDURE — 36415 COLL VENOUS BLD VENIPUNCTURE: CPT

## 2024-09-24 PROCEDURE — 6360000002 HC RX W HCPCS: Performed by: FAMILY MEDICINE

## 2024-09-24 PROCEDURE — 2580000003 HC RX 258: Performed by: FAMILY MEDICINE

## 2024-09-24 PROCEDURE — 1100000000 HC RM PRIVATE

## 2024-09-24 PROCEDURE — 70551 MRI BRAIN STEM W/O DYE: CPT

## 2024-09-24 PROCEDURE — 97165 OT EVAL LOW COMPLEX 30 MIN: CPT

## 2024-09-24 PROCEDURE — 83036 HEMOGLOBIN GLYCOSYLATED A1C: CPT

## 2024-09-24 PROCEDURE — 99254 IP/OBS CNSLTJ NEW/EST MOD 60: CPT | Performed by: PSYCHIATRY & NEUROLOGY

## 2024-09-24 PROCEDURE — 6370000000 HC RX 637 (ALT 250 FOR IP): Performed by: FAMILY MEDICINE

## 2024-09-24 PROCEDURE — 97530 THERAPEUTIC ACTIVITIES: CPT

## 2024-09-24 RX ORDER — ACETAMINOPHEN 325 MG/1
650 TABLET ORAL EVERY 6 HOURS PRN
Status: DISCONTINUED | OUTPATIENT
Start: 2024-09-24 | End: 2024-09-26 | Stop reason: HOSPADM

## 2024-09-24 RX ADMIN — ATORVASTATIN CALCIUM 80 MG: 40 TABLET, FILM COATED ORAL at 20:44

## 2024-09-24 RX ADMIN — SODIUM CHLORIDE, PRESERVATIVE FREE 10 ML: 5 INJECTION INTRAVENOUS at 20:45

## 2024-09-24 RX ADMIN — ACETAMINOPHEN 650 MG: 325 TABLET ORAL at 03:32

## 2024-09-24 RX ADMIN — ENOXAPARIN SODIUM 40 MG: 100 INJECTION SUBCUTANEOUS at 14:10

## 2024-09-24 RX ADMIN — ASPIRIN 81 MG 81 MG: 81 TABLET ORAL at 08:26

## 2024-09-24 RX ADMIN — SODIUM CHLORIDE, PRESERVATIVE FREE 10 ML: 5 INJECTION INTRAVENOUS at 08:26

## 2024-09-24 ASSESSMENT — PAIN DESCRIPTION - DESCRIPTORS: DESCRIPTORS: TIGHTNESS;DISCOMFORT

## 2024-09-24 ASSESSMENT — PAIN SCALES - GENERAL
PAINLEVEL_OUTOF10: 3
PAINLEVEL_OUTOF10: 5
PAINLEVEL_OUTOF10: 0

## 2024-09-24 ASSESSMENT — PAIN DESCRIPTION - LOCATION: LOCATION: CHEST

## 2024-09-24 ASSESSMENT — PAIN DESCRIPTION - ORIENTATION: ORIENTATION: MID

## 2024-09-24 ASSESSMENT — PAIN DESCRIPTION - FREQUENCY: FREQUENCY: INTERMITTENT

## 2024-09-24 NOTE — CARE COORDINATION
Updated clinicals have been sent to [facility]. Spoke with liaison and gave update. possible discharge tomorrow.

## 2024-09-24 NOTE — PROGRESS NOTES
PT eval order received and acknowledged. Pt screened and is currently presenting with baseline independence for all functional mobility/transfers. Pt denies numbness/tingling throughout, denies acute vision changes, no slurred speech noted, coordination and light touch sensation grossly intact. PT evaluation order will be discontinued at this time as pt has no acute PT needs. Please reorder PT if pt functional status changes. Thank you.    [facility] AM-PAC™ “6 Clicks”         Basic Mobility Inpatient Short Form  How much difficulty does the patient currently have... Unable A Lot A Little None   1.  Turning over in bed (including adjusting bedclothes, sheets and blankets)?   [] 1   [] 2   [] 3   [x] 4   2.  Sitting down on and standing up from a chair with arms ( e.g., wheelchair, bedside commode, etc.)   [] 1   [] 2   [] 3   [x] 4   3.  Moving from lying on back to sitting on the side of the bed?   [] 1   [] 2   [] 3   [x] 4          How much help from another person does the patient currently need... Total A Lot A Little None   4.  Moving to and from a bed to a chair (including a wheelchair)?   [] 1   [] 2   [] 3   [x] 4   5.  Need to walk in hospital room?   [] 1   [] 2   [] 3   [x] 4   6.  Climbing 3-5 steps with a railing?   [] 1   [] 2   [] 3   [x] 4   © 2007, Trustees of [facility], under license to [employer]. All rights reserved     Score:  Initial: 24/24 Most Recent: X (Date: 9/24/2024 )   Interpretation of Tool:  Represents activities that are increasingly more difficult (i.e. Bed mobility, Transfers, Gait).  Score 24 23 22-20 19-15 14-10 9-7 6   Modifier CH CI CJ CK CL CM CN

## 2024-09-24 NOTE — PLAN OF CARE
OCCUPATIONAL THERAPY EVALUATION AND DISCHARGE  Patient: Eric Victoria (59 y.o. male)  Date: 9/24/2024  Primary Diagnosis: Numbness [R20.0]  CVA (cerebrovascular accident due to intracerebral hemorrhage) (HCC) [I61.9]  Chest pain, unspecified type [R07.9]       Precautions: Fall Risk, General Precautions                Recommendations for nursing mobility: Use of bed/chair alarm for safety and gurads present     In place during session:EKG/telemetry   ASSESSMENT  Pt is a 59 y.o. male presenting to [facility] with c/o R sided numbness with hx of lupus. Pt was admitted on 9/23/24 and currently being treated for CVA workup. CT was negative. Pt received semi-supine in bed upon arrival, AXO x4, and agreeable to OT evaluation.     Based on the objective data described below pt currently present at baseline MOD I for ADLs and function mobility/transfers at this time. (See below for objective details and assist levels).     Overall pt tolerated session well today with no c/o pain during tx session. Pt was able to complete oral hygiene while long sitting in bed with MOD I. Pt was able to complete bed mobility with MOD I and performed functional mobility from bed to toilet with MOD I, no AD. Pt completed hand hygiene at sink with MOD I.  Pt has no skilled acute OT needs at this time either noted by OT or reported by pt, will DC skilled OT following evaluation; pt verbalized understanding and agreement.  Current OT DC recommendation No skilled occupational therapy, return to previous living settingonce medically appropriate.       PLAN :  Recommendations and Planned Interventions: DC from skilled OT services following evaluation.     Rationale for discharge: Patient currently at functional baseline for transfers/mobility, no further skilled therapy required at this time    Frequency/Duration: DC from OT services following evaluation due to Patient currently at functional baseline for transfers/mobility, no further skilled therapy    Grooming: Modified independent   Toileting: Modified independent     Therapeutic Intervention provided:   energy conservation, oral care, hand hygiene, and toileting    Functional Measure:    [facility] AM-PAC \"6 Clicks\"                                                       Daily Activity Inpatient Short Form  How much help from another person does the patient currently need... Total; A Lot A Little None   1.  Putting on and taking off regular lower body clothing? []  1 []  2 [x]  3 []  4   2.  Bathing (including washing, rinsing, drying)? []  1 []  2 [x]  3 []  4   3.  Toileting, which includes using toilet, bedpan or urinal? [] 1 []  2 []  3 [x]  4   4.  Putting on and taking off regular upper body clothing? []  1 []  2 []  3 [x]  4   5.  Taking care of personal grooming such as brushing teeth? []  1 []  2 []  3 [x]  4   6.  Eating meals? []  1 []  2 []  3 [x]  4   © , Trustees of [facility], under license to [employer]. All rights reserved     Score: 22/24     Interpretation of Tool:  Represents clinically-significant functional categories (i.e. Activities of daily living).  Percentage of Impairment CH    0%   CI    1-19% CJ    20-39% CK    40-59% CL    60-79% CM    80-99% CN     100%   AMPA  Score 6-24 24 23 20-22 15-19 10-14 7-9 6     Occupational Therapy Evaluation Charge Determination   History Examination Decision-Making   LOW Complexity : Brief history review  LOW Complexity: 1-3 Performance deficits relating to physical, cognitive, or psychosocial skills that result in activity limitations and/or participation restrictions LOW Complexity: No comorbidities that affect functional and  no verbal  or physical assist needed to complete eval tasks      Based on the above components, the patient evaluation is determined to be of the following complexity level: Low    Pain Ratin/10   Pain Intervention(s):       Activity Tolerance:   Good and Fair     After treatment patient left in no

## 2024-09-24 NOTE — PROGRESS NOTES
PT eval order received and acknowledged. PT eval attempted at 1106 however pt currently DANIELLE at [facility]. Will continue to follow patient and attempt PT eval at a later time. Thank you.

## 2024-09-24 NOTE — CONSULTS
formerly [facility] NEUROLOGY CONSULTATION          Chief Complaint/Admission Diagnosis: Numbness [R20.0]  CVA (cerebrovascular accident due to intracerebral hemorrhage) (HCC) [I61.9]  Chest pain, unspecified type [R07.9]     I have been asked to see this 59 y.o. male in neurological consultation by Tyra Franco MD  to render advice and opinion regarding stroke.     ?     Impression/Recommendations:   Patient is a 59 y.o. year old male history of lupus BPH came [facility] emergency room from the correctional facility with facial numbness. CT head is negative for any acute change.  EKG shows NSR. Echo showed normal EF with a negative bubble study. He has normal exam today but yesterday per patient he had right leg numbness.    Aspirin 325 mg plus Plavix 300 mg in ED  DAPT for 30 days (Aspirin 325 mg qd plus Plavix 75 mg qd) then switch to Plavix 75 mg qd  Atorvastatin 80 mg qd  PT/OT recommended  NPO until see by bedside swallowing or SLP evaluation.  MRI brain stroke protocol pending  Permissive hypertension. Don't treat HTN until SBP more than 180.  A1c, Cholesterol Panel  Cardiac telemetry  Carotid US               Melquiades Romano MD  Teleneurologist    HPI:   History was obtained from a review of the electronic record and from the patient and family.??   Patient is a 59 y.o. year old male history of lupus BPH came to emergency room from the correctional facility with facial numbness since Saturday not today can move towards no numbness in the hands arm no weakness no slurred speech no difficulty in swallowing seen by the ER physician patient have a CT scan of the head done which was negative teleneurology was consulted recommended patient to be admitted for further workup for stroke     Patient denies any chest pain shortness of breath nausea vomiting diarrhea constipation no fever no chills  ?     Review of Symptoms:     A ten system review of constitutional, cardiovascular, respiratory, musculoskeletal,  Tyra Sargent MD with the assistance of a trained virtual health liaison working at the originating site.? The consultation used real time licensed and encrypted telehealth equipment which allowed a live video connection between my location and the patient's location.? Aspects of the evaluation that could not be adequately evaluated by virtual assessment were shared with both the patient and the consulting provider.?          A total of 45 minutes of time was spent in direct care and coordination of care with the patient.

## 2024-09-24 NOTE — PROGRESS NOTES
History and Physical    NAME:   Eric Victoria   :  1965   MRN:  [id number]     Date/Time: 2024 12:54 PM    Patient PCP: Rolf Larkin MD  ______________________________________________________________________       Subjective:     CHIEF COMPLAINT:     Right facial numbness        HISTORY OF PRESENT ILLNESS:     General Daily Progress Note  Patient is a 59 y.o. year old male history of lupus BPH came to emergency room from the correctional facility with facial numbness since Saturday not today can move towards no numbness in the hands arm no weakness no slurred speech no difficulty in swallowing seen by the ER physician patient have a CT scan of the head done which was negative teleneurology was consulted recommended patient to be admitted for further workup for stroke    Patient denies any chest pain shortness of breath nausea vomiting diarrhea constipation no fever no chills        Patient was seen by the neurology workup still pending    MRI negative        Past Medical History:   Diagnosis Date    Autoimmune disease (HCC)     Lupus    BPH (benign prostatic hyperplasia)     Psychiatric disorder     Anxiety and depression        History reviewed. No pertinent surgical history.    Social History     Tobacco Use    Smoking status: Former    Smokeless tobacco: Never   Substance Use Topics    Alcohol use: Not Currently        History reviewed. No pertinent family history.    Allergies   Allergen Reactions    Penicillin G Other (See Comments)     Pt stated he got \"thrush\" the last time he had the med in         Prior to Admission medications    Medication Sig Start Date End Date Taking? Authorizing Provider   aspirin-acetaminophen-caffeine (EXCEDRIN EXTRA STRENGTH) 250-250-65 MG per tablet Take 2 tablets by mouth 2 times daily   Yes Provider, Giovana, MD   finasteride (PROSCAR) 5 MG tablet Take 2 tablets by mouth daily   Yes Automatic Reconciliation, Ar         Current Facility-Administered  Platelets 158 150 - 400 K/uL    MPV 12.3 8.9 - 12.9 FL    Nucleated RBCs 0.0 0.0  WBC    nRBC 0.00 0.00 - 0.01 K/uL   Hemoglobin A1c    Collection Time: 09/24/24  5:31 AM   Result Value Ref Range    Hemoglobin A1C 5.5 4.0 - 5.6 %    Estimated Avg Glucose 111 mg/dL   Lipid Panel    Collection Time: 09/24/24  5:31 AM   Result Value Ref Range    LIPID PANEL        Cholesterol, Total 132 <200 mg/dL    Triglycerides 66 <150 mg/dL    HDL 56 mg/dL    LDL Cholesterol 62.8 0 - 100 mg/dL    VLDL Cholesterol Calculated 13.2 mg/dL    Chol/HDL Ratio 2.4 0.0 - 5.0     Vascular duplex carotid bilateral    Collection Time: 09/24/24 11:59 AM   Result Value Ref Range    Left CCA dist .0 cm/s    Left CCA dist EDV 29.7 cm/s    Left CCA prox .0 cm/s    Left CCA prox EDV 23.3 cm/s    Left ICA dist PSV 61.5 cm/s    Left ICA dist EDV 20.7 cm/s    Left ICA prox PSV 64.2 cm/s    Left ICA prox EDV 16.2 cm/s    Left ECA .0 cm/s    Left ECA EDV 12.40 cm/s    Left subclavian prox PSV 82.9 cm/s    Left subclavian prox EDV 0.0 cm/s    Left vertebral PSV 69.0 cm/s    Left vertebral EDV 18.00 cm/s    Right cca dist .0 cm/s    Right CCA dist EDV 17.1 cm/s    Right CCA prox .0 cm/s    Right CCA prox EDV 16.1 cm/s    Right ICA dist PSV 77.3 cm/s    Right ICA dist EDV 20.0 cm/s    Right ICA prox PSV 74.5 cm/s    Right ICA prox EDV 21.5 cm/s    Right ECA PSV 81.5 cm/s    Right ECA EDV 9.51 cm/s    Right subclavian prox .0 cm/s    Right subclavian prox EDV 0.0 cm/s    Right vertebral PSV 39.4 cm/s    Right vertebral EDV 7.63 cm/s    Body Surface Area 1.85 m2    Right ICA/CCA PSV 0.65     Left ICA/CCA PSV 0.51            Xray Result (most recent):  CT HEAD WO CONTRAST    Result Date: 9/23/2024  No acute intracranial hemorrhage, mass or infarct. Electronically signed by Amish Granados MD       Vascular duplex carotid bilateral         MRI brain without contrast   Final Result   No significant abnormality or

## 2024-09-24 NOTE — PROGRESS NOTES
Consult received for bedside swallow evaluation. Patient passed swallow screen and tolerating regular diet per RN. No speech language deficits identified. Will d/c ST at this time. Please re-consult as medically indicated.

## 2024-09-24 NOTE — CARE COORDINATION
09/24/24 0817   Service Assessment   Patient Orientation Alert and Oriented   Cognition Alert   History Provided By Medical Record   Primary Caregiver Other (Comment)  ([facility])   Accompanied By/Relationship guards   Support Systems Other (Comment)  ([facility])   Patient's Healthcare Decision Maker is: Named in Scanned ACP Document   PCP Verified by CM Yes   Last Visit to PCP Within last year  ([facility])   Prior Functional Level Independent in ADLs/IADLs   Current Functional Level Assistance with the following:   Can patient return to prior living arrangement Yes   Ability to make needs known: Good   Family able to assist with home care needs: No   Would you like for me to discuss the discharge plan with any other family members/significant others, and if so, who? Yes   Financial Resources Other (Comment)   Community Resources None     Patient is inmate at [facility]. Will return once medically stable.    Clinicals faxed to [phone]  Clinical Liaison Purvi Gómez [phone]    Advance Care Planning   Healthcare Decision Maker:      Click here to complete Healthcare Decision Makers including selection of the Healthcare Decision Maker Relationship (ie \"Primary\").

## 2024-09-24 NOTE — PLAN OF CARE
Problem: Discharge Planning  Goal: Discharge to home or other facility with appropriate resources  9/24/2024 0050 by Madina Harris, RN  Outcome: Progressing  9/23/2024 1856 by Mary Johnston, RN  Outcome: Progressing     Problem: Skin/Tissue Integrity  Goal: Absence of new skin breakdown  Description: 1.  Monitor for areas of redness and/or skin breakdown  2.  Assess vascular access sites hourly  3.  Every 4-6 hours minimum:  Change oxygen saturation probe site  4.  Every 4-6 hours:  If on nasal continuous positive airway pressure, respiratory therapy assess nares and determine need for appliance change or resting period.  9/24/2024 0050 by Madina Harris, RN  Outcome: Progressing  9/23/2024 1856 by Mary Johnston, RN  Outcome: Progressing

## 2024-09-24 NOTE — PROGRESS NOTES
4 Eyes Skin Assessment     NAME:  Eric Victoria  YOB: 1965  MEDICAL RECORD NUMBER:  [id number]    The patient is being assessed for  Admission    I agree that at least one RN has performed a thorough Head to Toe Skin Assessment on the patient. ALL assessment sites listed below have been assessed.      Areas assessed by both nurses:    Head, Face, Ears, Shoulders, Back, Chest, Arms, Elbows, Hands, Sacrum. Buttock, Coccyx, Ischium, Legs. Feet and Heels, and Under Medical Devices         Does the Patient have a Wound? No noted wound(s)       Wilmar Prevention initiated by RN: Yes  Wound Care Orders initiated by RN: No    Pressure Injury (Stage 3,4, Unstageable, DTI, NWPT, and Complex wounds) if present, place Wound referral order by RN under : No    New Ostomies, if present place, Ostomy referral order under : No     Nurse 1 eSignature: Electronically signed by Agueda Chamorro RN on 9/24/24 at 6:56 AM EDT    **SHARE this note so that the co-signing nurse can place an eSignature**    Nurse 2 eSignature: Electronically signed by Madina Harris RN on 9/24/24 at 6:57 AM EDT

## 2024-09-25 LAB
ECHO BSA: 1.85 M2
VAS LEFT CCA DIST EDV: 29.7 CM/S
VAS LEFT CCA DIST PSV: 125 CM/S
VAS LEFT CCA PROX EDV: 23.3 CM/S
VAS LEFT CCA PROX PSV: 138 CM/S
VAS LEFT ECA EDV: 12.4 CM/S
VAS LEFT ECA PSV: 116 CM/S
VAS LEFT ICA DIST EDV: 20.7 CM/S
VAS LEFT ICA DIST PSV: 61.5 CM/S
VAS LEFT ICA PROX EDV: 16.2 CM/S
VAS LEFT ICA PROX PSV: 64.2 CM/S
VAS LEFT ICA/CCA PSV: 0.51
VAS LEFT SUBCLAVIAN PROX EDV: 0 CM/S
VAS LEFT SUBCLAVIAN PROX PSV: 82.9 CM/S
VAS LEFT VERTEBRAL EDV: 18 CM/S
VAS LEFT VERTEBRAL PSV: 69 CM/S
VAS RIGHT CCA DIST EDV: 17.1 CM/S
VAS RIGHT CCA DIST PSV: 115 CM/S
VAS RIGHT CCA PROX EDV: 16.1 CM/S
VAS RIGHT CCA PROX PSV: 117 CM/S
VAS RIGHT ECA EDV: 9.51 CM/S
VAS RIGHT ECA PSV: 81.5 CM/S
VAS RIGHT ICA DIST EDV: 20 CM/S
VAS RIGHT ICA DIST PSV: 77.3 CM/S
VAS RIGHT ICA PROX EDV: 21.5 CM/S
VAS RIGHT ICA PROX PSV: 74.5 CM/S
VAS RIGHT ICA/CCA PSV: 0.65
VAS RIGHT SUBCLAVIAN PROX EDV: 0 CM/S
VAS RIGHT SUBCLAVIAN PROX PSV: 251 CM/S
VAS RIGHT VERTEBRAL EDV: 7.63 CM/S
VAS RIGHT VERTEBRAL PSV: 39.4 CM/S

## 2024-09-25 PROCEDURE — 1100000000 HC RM PRIVATE

## 2024-09-25 PROCEDURE — 99291 CRITICAL CARE FIRST HOUR: CPT | Performed by: PSYCHIATRY & NEUROLOGY

## 2024-09-25 PROCEDURE — 6370000000 HC RX 637 (ALT 250 FOR IP): Performed by: FAMILY MEDICINE

## 2024-09-25 PROCEDURE — 6360000002 HC RX W HCPCS: Performed by: FAMILY MEDICINE

## 2024-09-25 PROCEDURE — 93880 EXTRACRANIAL BILAT STUDY: CPT | Performed by: SURGERY

## 2024-09-25 PROCEDURE — 2580000003 HC RX 258: Performed by: FAMILY MEDICINE

## 2024-09-25 RX ORDER — MYCOPHENOLATE MOFETIL 250 MG/1
500 CAPSULE ORAL 2 TIMES DAILY
Status: DISCONTINUED | OUTPATIENT
Start: 2024-09-26 | End: 2024-09-26 | Stop reason: HOSPADM

## 2024-09-25 RX ORDER — PREDNISONE 20 MG/1
20 TABLET ORAL DAILY
Status: DISCONTINUED | OUTPATIENT
Start: 2024-09-26 | End: 2024-09-26 | Stop reason: HOSPADM

## 2024-09-25 RX ADMIN — SODIUM CHLORIDE, PRESERVATIVE FREE 10 ML: 5 INJECTION INTRAVENOUS at 08:12

## 2024-09-25 RX ADMIN — ASPIRIN 81 MG 81 MG: 81 TABLET ORAL at 08:12

## 2024-09-25 RX ADMIN — ENOXAPARIN SODIUM 40 MG: 100 INJECTION SUBCUTANEOUS at 15:25

## 2024-09-25 RX ADMIN — ACETAMINOPHEN 650 MG: 325 TABLET ORAL at 22:05

## 2024-09-25 RX ADMIN — SODIUM CHLORIDE, PRESERVATIVE FREE 10 ML: 5 INJECTION INTRAVENOUS at 22:00

## 2024-09-25 RX ADMIN — ATORVASTATIN CALCIUM 80 MG: 40 TABLET, FILM COATED ORAL at 22:05

## 2024-09-25 ASSESSMENT — PAIN DESCRIPTION - ORIENTATION: ORIENTATION: MID

## 2024-09-25 ASSESSMENT — ENCOUNTER SYMPTOMS
RESPIRATORY NEGATIVE: 1
ALLERGIC/IMMUNOLOGIC NEGATIVE: 1
GASTROINTESTINAL NEGATIVE: 1
EYES NEGATIVE: 1

## 2024-09-25 ASSESSMENT — PAIN DESCRIPTION - DESCRIPTORS: DESCRIPTORS: ACHING

## 2024-09-25 ASSESSMENT — PAIN SCALES - GENERAL: PAINLEVEL_OUTOF10: 7

## 2024-09-25 ASSESSMENT — PAIN DESCRIPTION - LOCATION: LOCATION: CHEST

## 2024-09-25 ASSESSMENT — PAIN - FUNCTIONAL ASSESSMENT: PAIN_FUNCTIONAL_ASSESSMENT: ACTIVITIES ARE NOT PREVENTED

## 2024-09-25 NOTE — PROGRESS NOTES
Progress Note:      [facility] NEUROLOGY PROGRESS NOTE          Impression/Recommendations:   Patient is a 59 y.o. year old male history of lupus BPH came to emergency room from the correctional facility with facial numbness. CT head is negative for any acute change.  EKG shows NSR. Echo showed normal EF with a negative bubble study. He has normal exam today but yesterday per patient he had right leg numbness. MRI brain is negative for any acute change. Echo showed normal EF with a negative bubble study.       Patient symptoms are not consistent with stroke or TIA and stroke preventative medication are not necessary     A1c, Cholesterol Panel results are reviewed.  US carotid, echo, and MRI results are reviewed.  No further neurological recommendations.  Thank you for the consult.          ?     Melquiades Romano MD  Teleneurologist    SUBJECTIVE   Eric Victoria is a 59 y.o. male being evaluated for stroke.     ?     OBJECTIVE   ROS, PMH, FH, SH were all reviewed and are unchanged.   Neurological Examination:   BP (!) 120/90   Pulse 86   Temp 98.4 °F (36.9 °C) (Oral)   Resp 18   Ht 1.6 m (5' 3\")   Wt 77.1 kg (170 lb)   SpO2 100%   BMI 30.11 kg/m²    Assisted by MOE Shultz   Mental status:  Patient was Alert.  Speech was fluent and articulate. Mental status exam was grossly within normal limits.   Cranial nerves:   Pupils were equally reactive. EOMI.  There was no facial numbness or weakness.   There was a good shrug.   Tongue protruded on the midline.   Motor:   There was no pronator drift.   Legs could be lifted off the bed for 5 seconds.   No abnormal movements.  Sensory:   Sensation was intact to light touch.  Coordination:   There was no dysmetria.  Gait:   Not tested due to fall concerns     ?  Current Facility-Administered Medications   Medication Dose Route Frequency Provider Last Rate Last Admin    acetaminophen (TYLENOL) tablet 650 mg  650 mg Oral Q6H PRN Tyra Sargent MD   650 mg at 09/24/24

## 2024-09-25 NOTE — PROGRESS NOTES
Allergen Reactions    Penicillin G Other (See Comments)     Pt stated he got \"thrush\" the last time he had the med in 2008        Prior to Admission medications    Medication Sig Start Date End Date Taking? Authorizing Provider   aspirin-acetaminophen-caffeine (EXCEDRIN EXTRA STRENGTH) 250-250-65 MG per tablet Take 2 tablets by mouth 2 times daily   Yes Provider, MD Giovana   finasteride (PROSCAR) 5 MG tablet Take 2 tablets by mouth daily   Yes Automatic Reconciliation, Ar         Current Facility-Administered Medications:     acetaminophen (TYLENOL) tablet 650 mg, 650 mg, Oral, Q6H PRN, Tyra Sargent MD, 650 mg at 09/24/24 0332    sodium chloride flush 0.9 % injection 5-40 mL, 5-40 mL, IntraVENous, 2 times per day, Tyra Sargent MD, 10 mL at 09/25/24 0812    sodium chloride flush 0.9 % injection 5-40 mL, 5-40 mL, IntraVENous, PRN, Tyra Sargent MD    0.9 % sodium chloride infusion, , IntraVENous, PRN, Tyra Sargent MD    ondansetron (ZOFRAN-ODT) disintegrating tablet 4 mg, 4 mg, Oral, Q8H PRN **OR** ondansetron (ZOFRAN) injection 4 mg, 4 mg, IntraVENous, Q6H PRN, Tyra Sargent MD    polyethylene glycol (GLYCOLAX) packet 17 g, 17 g, Oral, Daily PRN, Tyra Sargent MD    enoxaparin (LOVENOX) injection 40 mg, 40 mg, SubCUTAneous, Daily, Tyra Sargent MD, 40 mg at 09/24/24 1410    atorvastatin (LIPITOR) tablet 80 mg, 80 mg, Oral, Nightly, Tyra Sargent MD, 80 mg at 09/24/24 2044    aspirin chewable tablet 81 mg, 81 mg, Oral, Daily, 81 mg at 09/25/24 0812 **OR** aspirin suppository 300 mg, 300 mg, Rectal, Daily, Tyra Sargent MD    LAB DATA REVIEWED:    No results found for this or any previous visit (from the past 24 hour(s)).          Xray Result (most recent):  CT HEAD WO CONTRAST    Result Date: 9/23/2024  No acute intracranial hemorrhage, mass or infarct. Electronically signed by Amish Granados MD       Vascular duplex carotid bilateral   Final Result      MRI brain without  contrast   Final Result   No significant abnormality or acute process.         Electronically signed by Liu Powers      CT HEAD WO CONTRAST   Final Result   No acute intracranial hemorrhage, mass or infarct.          Electronically signed by Amish Granados MD           Review of Systems:  Constitutional: Negative for chills and fever.   HENT: Negative.    Eyes: Negative.    Respiratory: Negative.    Cardiovascular: Negative.    Gastrointestinal: Negative for abdominal pain and nausea.   Skin: Negative.    Neurological: Negative.      Objective:   VITALS:    Vitals:    09/25/24 0757   BP: (!) 120/90   Pulse: 86   Resp: 18   Temp: 98.4 °F (36.9 °C)   SpO2: 100%       Physical Exam:   Constitutional: pt is oriented to person, place, and time.   HENT:   Head: Normocephalic and atraumatic.   Eyes: Pupils are equal, round, and reactive to light. EOM are normal.   Cardiovascular: Normal rate, regular rhythm and normal heart sounds.   Pulmonary/Chest: Breath sounds normal. No wheezes. No rales.   Exhibits no tenderness.   Abdominal: Soft. Bowel sounds are normal. There is no abdominal tenderness. There is no rebound and no guarding.   Musculoskeletal: Normal range of motion.   Neurological: pt is alert and oriented to person, place, and time. Alert. Normal strength. No cranial nerve deficit or sensory deficit. Displays a negative Romberg sign.        ASSESSMENT & PLAN:    Right facial numbness rule out CVA  History of lupus not on any medications  BPH    Patient want to see a rheumatologist before discharge    Will do rheumatology consult and possible discharge home tomorrow            Current Facility-Administered Medications:     acetaminophen (TYLENOL) tablet 650 mg, 650 mg, Oral, Q6H PRN, Tyra Sargent MD, 650 mg at 09/24/24 0332    sodium chloride flush 0.9 % injection 5-40 mL, 5-40 mL, IntraVENous, 2 times per day, Tyra Sargent MD, 10 mL at 09/25/24 0812    sodium chloride flush 0.9 % injection 5-40 mL, 5-40

## 2024-09-25 NOTE — PROGRESS NOTES
4 Eyes Skin Assessment     NAME:  Eric Victoria  YOB: 1965  MEDICAL RECORD NUMBER:  [id number]    The patient is being assessed for  Other weekly assessment    I agree that at least one RN has performed a thorough Head to Toe Skin Assessment on the patient. ALL assessment sites listed below have been assessed.      Areas assessed by both nurses:    Head, Face, Ears, Shoulders, Back, Chest, Arms, Elbows, Hands, Sacrum. Buttock, Coccyx, Ischium, Legs. Feet and Heels, and Under Medical Devices         Does the Patient have a Wound? No noted wound(s)       Wilmar Prevention initiated by RN: Yes  Wound Care Orders initiated by RN: No    Pressure Injury (Stage 3,4, Unstageable, DTI, NWPT, and Complex wounds) if present, place Wound referral order by RN under : No    New Ostomies, if present place, Ostomy referral order under : No     Nurse 1 eSignature: Electronically signed by Agueda Chamorro RN on 9/25/24 at 3:56 AM EDT    **SHARE this note so that the co-signing nurse can place an eSignature**    Nurse 2 eSignature: Electronically signed by Madina Harris RN on 9/25/24 at 4:32 AM EDT

## 2024-09-25 NOTE — PROGRESS NOTES
History and Physical    NAME:   Eric Victoria   :  1965   MRN:  [id number]     Date/Time: 2024 11:04 AM    Patient PCP: Rolf Larkin MD  ______________________________________________________________________       Subjective:     CHIEF COMPLAINT:     Right facial numbness        HISTORY OF PRESENT ILLNESS:     General Daily Progress Note  Patient is a 59 y.o. year old male history of lupus BPH came to emergency room from the correctional facility with facial numbness since Saturday not today can move towards no numbness in the hands arm no weakness no slurred speech no difficulty in swallowing seen by the ER physician patient have a CT scan of the head done which was negative teleneurology was consulted recommended patient to be admitted for further workup for stroke    Patient denies any chest pain shortness of breath nausea vomiting diarrhea constipation no fever no chills        Patient was seen by the neurology workup still pending    MRI negative      Patient presents today with no complaints.  Patient denies any facial numbness today.  He denies chest pain, shortness of breath, nausea, vomiting, and diarrhea.    Patient seen by neurology today --as per neurology, symptoms are not consistent with stroke or TIA and stroke preventative medications are not necessary. No further neurological recommendations.    24 brain MRI showed No significant abnormality or acute process.   24 CT head showed No acute intracranial hemorrhage, mass or infarct.       Past Medical History:   Diagnosis Date    Autoimmune disease (HCC)     Lupus    BPH (benign prostatic hyperplasia)     Psychiatric disorder     Anxiety and depression        History reviewed. No pertinent surgical history.    Social History     Tobacco Use    Smoking status: Former    Smokeless tobacco: Never   Substance Use Topics    Alcohol use: Not Currently        History reviewed. No pertinent family history.    Allergies  Left ICA prox PSV 64.2 cm/s    Left ICA prox EDV 16.2 cm/s    Left ECA .0 cm/s    Left ECA EDV 12.40 cm/s    Left subclavian prox PSV 82.9 cm/s    Left subclavian prox EDV 0.0 cm/s    Left vertebral PSV 69.0 cm/s    Left vertebral EDV 18.00 cm/s    Right cca dist .0 cm/s    Right CCA dist EDV 17.1 cm/s    Right CCA prox .0 cm/s    Right CCA prox EDV 16.1 cm/s    Right ICA dist PSV 77.3 cm/s    Right ICA dist EDV 20.0 cm/s    Right ICA prox PSV 74.5 cm/s    Right ICA prox EDV 21.5 cm/s    Right ECA PSV 81.5 cm/s    Right ECA EDV 9.51 cm/s    Right subclavian prox .0 cm/s    Right subclavian prox EDV 0.0 cm/s    Right vertebral PSV 39.4 cm/s    Right vertebral EDV 7.63 cm/s    Body Surface Area 1.85 m2    Right ICA/CCA PSV 0.65     Left ICA/CCA PSV 0.51            Xray Result (most recent):  CT HEAD WO CONTRAST    Result Date: 9/23/2024  No acute intracranial hemorrhage, mass or infarct. Electronically signed by Amish Granados MD       Vascular duplex carotid bilateral   Final Result      MRI brain without contrast   Final Result   No significant abnormality or acute process.         Electronically signed by Liu Powers      CT HEAD WO CONTRAST   Final Result   No acute intracranial hemorrhage, mass or infarct.          Electronically signed by Amish Granados MD           Review of Systems:  Constitutional: Negative for chills and fever.   HENT: Negative.    Eyes: Negative.    Respiratory: Negative.    Cardiovascular: Negative.    Gastrointestinal: Negative for abdominal pain and nausea.   Skin: Negative.    Neurological: Negative.      Objective:   VITALS:    Vitals:    09/25/24 0757   BP: (!) 120/90   Pulse: 86   Resp: 18   Temp: 98.4 °F (36.9 °C)   SpO2: 100%       Physical Exam:   Constitutional: pt is oriented to person, place, and time.   HENT:   Head: Normocephalic and atraumatic.   Eyes: Pupils are equal, round, and reactive to light. EOM are normal.   Cardiovascular: Normal rate,

## 2024-09-25 NOTE — PLAN OF CARE
Problem: Discharge Planning  Goal: Discharge to home or other facility with appropriate resources  Outcome: Progressing     Problem: Skin/Tissue Integrity  Goal: Absence of new skin breakdown  Description: 1.  Monitor for areas of redness and/or skin breakdown  2.  Assess vascular access sites hourly  3.  Every 4-6 hours minimum:  Change oxygen saturation probe site  4.  Every 4-6 hours:  If on nasal continuous positive airway pressure, respiratory therapy assess nares and determine need for appliance change or resting period.  9/24/2024 2212 by Madina Harris, RN  Outcome: Progressing  9/24/2024 0831 by Sharda Couch RN  Outcome: Progressing     Problem: Pain  Goal: Verbalizes/displays adequate comfort level or baseline comfort level  9/24/2024 2212 by Madina Harris RN  Outcome: Progressing  9/24/2024 0831 by Sharda Couch RN  Outcome: Progressing     Problem: Chronic Conditions and Co-morbidities  Goal: Patient's chronic conditions and co-morbidity symptoms are monitored and maintained or improved  Outcome: Progressing

## 2024-09-26 VITALS
SYSTOLIC BLOOD PRESSURE: 120 MMHG | WEIGHT: 170 LBS | HEIGHT: 63 IN | TEMPERATURE: 98.6 F | HEART RATE: 74 BPM | OXYGEN SATURATION: 100 % | RESPIRATION RATE: 18 BRPM | BODY MASS INDEX: 30.12 KG/M2 | DIASTOLIC BLOOD PRESSURE: 88 MMHG

## 2024-09-26 LAB
-: ABNORMAL
CRP SERPL-MCNC: <0.29 MG/DL (ref 0–0.3)
ERYTHROCYTE [SEDIMENTATION RATE] IN BLOOD: 6 MM/HR (ref 0–20)
GLUCOSE BLD STRIP.AUTO-MCNC: 79 MG/DL (ref 65–100)
HAV IGM SER QL: NONREACTIVE
HBV CORE IGM SER QL: NONREACTIVE
HBV SURFACE AG SER QL: <0.1 INDEX
HBV SURFACE AG SER QL: NEGATIVE
HCV AB SER IA-ACNC: >11 INDEX
HCV AB SERPL QL IA: REACTIVE
PERFORMED BY:: NORMAL

## 2024-09-26 PROCEDURE — 85652 RBC SED RATE AUTOMATED: CPT

## 2024-09-26 PROCEDURE — 36415 COLL VENOUS BLD VENIPUNCTURE: CPT

## 2024-09-26 PROCEDURE — 6370000000 HC RX 637 (ALT 250 FOR IP): Performed by: FAMILY MEDICINE

## 2024-09-26 PROCEDURE — 86235 NUCLEAR ANTIGEN ANTIBODY: CPT

## 2024-09-26 PROCEDURE — 86140 C-REACTIVE PROTEIN: CPT

## 2024-09-26 PROCEDURE — 86038 ANTINUCLEAR ANTIBODIES: CPT

## 2024-09-26 PROCEDURE — 6370000000 HC RX 637 (ALT 250 FOR IP): Performed by: INTERNAL MEDICINE

## 2024-09-26 PROCEDURE — 2580000003 HC RX 258: Performed by: FAMILY MEDICINE

## 2024-09-26 PROCEDURE — 6360000002 HC RX W HCPCS: Performed by: INTERNAL MEDICINE

## 2024-09-26 PROCEDURE — 82962 GLUCOSE BLOOD TEST: CPT

## 2024-09-26 PROCEDURE — 80074 ACUTE HEPATITIS PANEL: CPT

## 2024-09-26 PROCEDURE — 86225 DNA ANTIBODY NATIVE: CPT

## 2024-09-26 PROCEDURE — 86160 COMPLEMENT ANTIGEN: CPT

## 2024-09-26 RX ORDER — ASPIRIN 81 MG/1
81 TABLET, CHEWABLE ORAL DAILY
Qty: 30 TABLET | Refills: 3 | Status: SHIPPED | OUTPATIENT
Start: 2024-09-27

## 2024-09-26 RX ORDER — PREDNISONE 20 MG/1
20 TABLET ORAL DAILY
Qty: 10 TABLET | Refills: 0 | Status: SHIPPED | OUTPATIENT
Start: 2024-09-27 | End: 2024-10-07

## 2024-09-26 RX ORDER — MYCOPHENOLATE MOFETIL 250 MG/1
500 CAPSULE ORAL 2 TIMES DAILY
Qty: 60 CAPSULE | Refills: 3 | Status: SHIPPED | OUTPATIENT
Start: 2024-09-26

## 2024-09-26 RX ORDER — ATORVASTATIN CALCIUM 80 MG/1
80 TABLET, FILM COATED ORAL NIGHTLY
Qty: 30 TABLET | Refills: 3 | Status: SHIPPED | OUTPATIENT
Start: 2024-09-26

## 2024-09-26 RX ADMIN — PREDNISONE 20 MG: 20 TABLET ORAL at 08:57

## 2024-09-26 RX ADMIN — MYCOPHENOLATE MOFETIL 500 MG: 250 CAPSULE ORAL at 08:57

## 2024-09-26 RX ADMIN — ACETAMINOPHEN 650 MG: 325 TABLET ORAL at 08:56

## 2024-09-26 RX ADMIN — SODIUM CHLORIDE, PRESERVATIVE FREE 10 ML: 5 INJECTION INTRAVENOUS at 08:57

## 2024-09-26 RX ADMIN — ASPIRIN 81 MG 81 MG: 81 TABLET ORAL at 08:57

## 2024-09-26 ASSESSMENT — PAIN DESCRIPTION - DESCRIPTORS: DESCRIPTORS: ACHING;DISCOMFORT

## 2024-09-26 ASSESSMENT — PAIN SCALES - GENERAL
PAINLEVEL_OUTOF10: 4
PAINLEVEL_OUTOF10: 5

## 2024-09-26 ASSESSMENT — PAIN DESCRIPTION - LOCATION: LOCATION: GENERALIZED;OTHER (COMMENT)

## 2024-09-26 NOTE — DISCHARGE INSTRUCTIONS
Discharge Instructions       PATIENT ID: Eric Victoria  MRN: [id number]   YOB: 1965    DATE OF ADMISSION: 9/23/2024  DATE OF DISCHARGE: 9/26/2024    PRIMARY CARE PROVIDER: [unfilled]     ATTENDING PHYSICIAN: Tyra Sargent MD   DISCHARGING PROVIDER: Tyra Sargent MD    To contact this individual call [phone] and ask the  to page.   If unavailable, ask to be transferred the Adult Hospitalist Department.    DISCHARGE DIAGNOSES Lupus no CVA    CONSULTATIONS: [unfilled]      PROCEDURES/SURGERIES: * No surgery found *    PENDING TEST RESULTS:   At the time of discharge the following test results are still pending: Follow-up with rheumatology    FOLLOW UP APPOINTMENTS:   Rolf Larkin MD  [address] [facility] [address] [phone]    Schedule an appointment as soon as possible for a visit in 1 week(s)         ADDITIONAL CARE RECOMMENDATIONS: ***    DIET: {diet:89223}      ACTIVITY: {discharge activity:33923}    Wound care: {Discharge Wound Care Instructions:35490}    EQUIPMENT needed: ***      DISCHARGE MEDICATIONS:   See Medication Reconciliation Form    It is important that you take the medication exactly as they are prescribed.   Keep your medication in the bottles provided by the pharmacist and keep a list of the medication names, dosages, and times to be taken in your wallet.   Do not take other medications without consulting your doctor.       NOTIFY YOUR PHYSICIAN FOR ANY OF THE FOLLOWING:   Fever over 101 degrees for 24 hours.   Chest pain, shortness of breath, fever, chills, nausea, vomiting, diarrhea, change in mentation, falling, weakness, bleeding. Severe pain or pain not relieved by medications.  Or, any other signs or symptoms that you may have questions about.      DISPOSITION:    Home With:   OT  PT  HH  RN       SNF/Inpatient Rehab/LTAC    Independent/assisted living    Hospice    Other:         PROBLEM LIST Updated:  Yes ***       Signed:   Tyra

## 2024-09-26 NOTE — PROGRESS NOTES
History and Physical    NAME:   Eric Victoria   :  1965   MRN:  [id number]     Date/Time: 2024 11:05 AM    Patient PCP: Rolf Larkin MD  ______________________________________________________________________       Subjective:     CHIEF COMPLAINT:     Right facial numbness        HISTORY OF PRESENT ILLNESS:     General Daily Progress Note  Patient is a 59 y.o. year old male history of lupus BPH came to emergency room from the correctional facility with facial numbness since Saturday not today can move towards no numbness in the hands arm no weakness no slurred speech no difficulty in swallowing seen by the ER physician patient have a CT scan of the head done which was negative teleneurology was consulted recommended patient to be admitted for further workup for stroke    Patient denies any chest pain shortness of breath nausea vomiting diarrhea constipation no fever no chills        Patient was seen by the neurology workup still pending    MRI negative      Patient presents today with no complaints.  Patient denies any facial numbness today.  He denies chest pain, shortness of breath, nausea, vomiting, and diarrhea.    Patient seen by neurology today --as per neurology, symptoms are not consistent with stroke or TIA and stroke preventative medications are not necessary. No further neurological recommendations.    24 brain MRI showed No significant abnormality or acute process.   24 CT head showed No acute intracranial hemorrhage, mass or infarct.       Pt presents today resting in bed without any complaints. He denies shortness of breath, nausea, vomiting, and diarrhea. He denies any facial numbness. He continues to have some chest discomfort and was seen by rheumatology yesterday. Pt was started on prednisone taper and CellCept.       Past Medical History:   Diagnosis Date    Autoimmune disease (HCC)     Lupus    BPH (benign prostatic hyperplasia)     Psychiatric disorder

## 2024-09-26 NOTE — DISCHARGE INSTR - COC
Continuity of Care Form    Patient Name: Eric Victoria   :  1965  MRN:  [id number]    Admit date:  2024  Discharge date:  2024    Code Status Order: Full Code   Advance Directives:   Advance Care Flowsheet Documentation             Admitting Physician:  Tyra Sargent MD  PCP: Rolf Larkin MD    Discharging Nurse: Shaye Pittman  Discharging Hospital Unit/Room#: 571/01  Discharging Unit Phone Number: [phone]    Emergency Contact:   No emergency contact information on file.    Past Surgical History:  History reviewed. No pertinent surgical history.    Immunization History:     There is no immunization history on file for this patient.    Active Problems:  Patient Active Problem List   Diagnosis Code    Chest pain R07.9    Numbness R20.0    CVA (cerebrovascular accident due to intracerebral hemorrhage) ([facility]) I61.9       Isolation/Infection:   Isolation            No Isolation          Patient Infection Status       Infection Onset Added Last Indicated Last Indicated By Review Planned Expiration Resolved Resolved By    Pulmonary Tuberculosis (Rule Out) 24 Quantiferon, Incubated (Ordered)                Nurse Assessment:  Last Vital Signs: /88   Pulse 74   Temp 98.6 °F (37 °C) (Oral)   Resp 18   Ht 1.6 m (5' 3\")   Wt 77.1 kg (170 lb)   SpO2 100%   BMI 30.11 kg/m²     Last documented pain score (0-10 scale): Pain Level: 4  Last Weight:   Wt Readings from Last 1 Encounters:   24 77.1 kg (170 lb)     Mental Status:  oriented    IV Access:  - None    Nursing Mobility/ADLs:  Walking   Assisted  Transfer  Assisted  Bathing  Assisted  Dressing  Assisted  Toileting  Assisted  Feeding  Independent  Med Admin  Assisted  Med Delivery   whole    Wound Care Documentation and Therapy:        Elimination:  Continence:   Bowel: Yes  Bladder: Yes  Urinary Catheter: None   Colostomy/Ileostomy/Ileal Conduit: No       Date of Last BM: 2024  No intake or output data

## 2024-09-26 NOTE — H&P
59-year-old male presenting to the hospital with symptoms of numbness and weakness, lasting several days. Symptoms resolved after admission to the hospital. Patient was admitted to be evaluated for possible stroke. Neurology provided consultation, and initial evaluation. Patient subsequently experienced improvement of symptoms. Past medical history is significant for possible lupus. The rheumatology services consulted to evaluate the patient for past history of lupus.    Patient was seen this afternoon. He reports previous diagnosis of lupus about 10 years ago. Reports family history of lupus involving his sister as well. Patient reports previous treatment using hydroxychloroquine and azathioprine. He has not used either medication for multiple years. Patient reports episodes of inflammatory joint pains, pleuritic chest pains, describing serositis. Episodes are treated intermittently using steroids predominantly. Patient reports previously following with rheumatology had [facility]. He has not been seen in that rheumatology clinic in multiple years. Patient complains of arthritis pains involving bilateral knees, hands at this time. Denies joint swelling, does have joint stiffness. Patient complains of mild chest discomfort today. No shortness of breath. Patient denies inflammatory skin rashes. Further denies mouth sores or hair loss. Patient reports having frequent laboratory evaluations, not sure if this includes lupus monitoring studies. Patient not on any consistent DMARD therapies at this time.    Loss of Consciousness  Associated symptoms include chest pain and weakness.          No current facility-administered medications on file prior to encounter.     Current Outpatient Medications on File Prior to Encounter   Medication Sig Dispense Refill    aspirin-acetaminophen-caffeine (EXCEDRIN EXTRA STRENGTH) 250-250-65 MG per tablet Take 2 tablets by mouth 2 times daily      finasteride (PROSCAR) 5 MG tablet Take 2  aspirin suppository 300 mg  300 mg Rectal Daily Tyra Sargent MD            BP (!) 140/90   Pulse 72   Temp 98.2 °F (36.8 °C) (Oral)   Resp 18   Ht 1.6 m (5' 3\")   Wt 77.1 kg (170 lb)   SpO2 100%   BMI 30.11 kg/m²        Physical Exam  HENT:      Head: Normocephalic.      Nose: Nose normal.   Eyes:      Pupils: Pupils are equal, round, and reactive to light.   Cardiovascular:      Rate and Rhythm: Normal rate.   Pulmonary:      Effort: Pulmonary effort is normal.   Abdominal:      General: Abdomen is flat.   Genitourinary:     Comments: Deferred.  Musculoskeletal:         General: Tenderness present.      Cervical back: Normal range of motion.      Comments: Tenderness bilateral knees.   Skin:     General: Skin is warm.   Neurological:      General: No focal deficit present.      Mental Status: He is alert.   Psychiatric:         Mood and Affect: Mood normal.        Latest Reference Range & Units 09/24/24 05:31   WBC 4.1 - 11.1 K/uL 3.4 (L)   RBC 4.10 - 5.70 M/uL 4.95   Hemoglobin Quant 12.1 - 17.0 g/dL 14.5   Hematocrit 36.6 - 50.3 % 43.9   MCV 80.0 - 99.0 FL 88.7   MCH 26.0 - 34.0 PG 29.3   MCHC 30.0 - 36.5 g/dL 33.0   MPV 8.9 - 12.9 FL 12.3   RDW 11.5 - 14.5 % 13.1   Platelet Count 150 - 400 K/uL 158   (L): Data is abnormally low   Latest Reference Range & Units 09/23/24 11:17   Sodium 136 - 145 mmol/L 142   Potassium 3.5 - 5.1 mmol/L 4.4   Chloride 97 - 108 mmol/L 112 (H)   CARBON DIOXIDE 21 - 32 mmol/L 26   BUN,BUNPL 6 - 20 mg/dL 7   Creatinine 0.70 - 1.30 mg/dL 0.76   Bun/Cre 12 - 20   9 (L)   Anion Gap 2 - 12 mmol/L 4   Est, Glom Filt Rate >60 ml/min/1.73m2 >90   Glucose 65 - 100 mg/dL 93   Calcium 8.5 - 10.1 mg/dL 8.6   Albumin/Globulin Ratio 1.1 - 2.2   1.1   Total Protein 6.4 - 8.2 g/dL 7.1   (H): Data is abnormally high  (L): Data is abnormally low    CT head 9/23/24:  FINDINGS: There is no acute intracranial hemorrhage, mass, mass effect or  herniation. Ventricular system is normal. The

## 2024-09-26 NOTE — PLAN OF CARE
Problem: Discharge Planning  Goal: Discharge to home or other facility with appropriate resources  Outcome: Progressing  Flowsheets (Taken 9/26/2024 0000)  Discharge to home or other facility with appropriate resources:   Identify barriers to discharge with patient and caregiver   Arrange for needed discharge resources and transportation as appropriate   Identify discharge learning needs (meds, wound care, etc)   Refer to discharge planning if patient needs post-hospital services based on physician order or complex needs related to functional status, cognitive ability or social support system     Problem: Skin/Tissue Integrity  Goal: Absence of new skin breakdown  Description: 1.  Monitor for areas of redness and/or skin breakdown  2.  Assess vascular access sites hourly  3.  Every 4-6 hours minimum:  Change oxygen saturation probe site  4.  Every 4-6 hours:  If on nasal continuous positive airway pressure, respiratory therapy assess nares and determine need for appliance change or resting period.  Outcome: Progressing     Problem: Pain  Goal: Verbalizes/displays adequate comfort level or baseline comfort level  Outcome: Progressing  Flowsheets (Taken 9/26/2024 0659)  Verbalizes/displays adequate comfort level or baseline comfort level:   Encourage patient to monitor pain and request assistance   Assess pain using appropriate pain scale   Administer analgesics based on type and severity of pain and evaluate response     Problem: Chronic Conditions and Co-morbidities  Goal: Patient's chronic conditions and co-morbidity symptoms are monitored and maintained or improved  Outcome: Progressing

## 2024-09-26 NOTE — DISCHARGE SUMMARY
Discharge Summary       PATIENT ID: Eric Victoria  MRN: [id number]   YOB: 1965    DATE OF ADMISSION: 9/23/2024   DATE OF DISCHARGE:   PRIMARY CARE PROVIDER: [unfilled]      ATTENDING PHYSICIAN: Tyra Sargent  DISCHARGING PROVIDER: Tyra Sargent      CONSULTATIONS: IP CONSULT TO NEUROLOGY  IP CONSULT TO TELE-NEUROLOGY  IP CONSULT TO RHEUMATOLOGY    PROCEDURES/SURGERIES: * No surgery found *    ADMITTING DIAGNOSES:    Patient Active Problem List    Diagnosis Date Noted    Numbness 09/23/2024    CVA (cerebrovascular accident due to intracerebral hemorrhage) ([facility]) 09/23/2024    Chest pain 10/17/2022       DISCHARGE DIAGNOSES / PLAN:      Right facial numbness rule out CVA  History of lupus not on any medications  BPH  Lupus        DISCHARGE MEDICATIONS:     Medication List        START taking these medications      aspirin 81 MG chewable tablet  Take 1 tablet by mouth daily  Start taking on: September 27, 2024     atorvastatin 80 MG tablet  Commonly known as: LIPITOR  Take 1 tablet by mouth nightly     mycophenolate 250 MG capsule  Commonly known as: CELLCEPT  Take 2 capsules by mouth 2 times daily     predniSONE 20 MG tablet  Commonly known as: DELTASONE  Take 1 tablet by mouth daily for 10 days  Start taking on: September 27, 2024            STOP taking these medications      Excedrin Extra Strength 250-250-65 MG per tablet  Generic drug: aspirin-acetaminophen-caffeine     finasteride 5 MG tablet  Commonly known as: PROSCAR               Where to Get Your Medications        Information about where to get these medications is not yet available    Ask your nurse or doctor about these medications  aspirin 81 MG chewable tablet  atorvastatin 80 MG tablet  mycophenolate 250 MG capsule  predniSONE 20 MG tablet             NOTIFY YOUR PHYSICIAN FOR ANY OF THE FOLLOWING:   Fever over 101 degrees for 24 hours.   Chest pain, shortness of breath, fever, chills, nausea, vomiting, diarrhea, change in  mentation, falling, weakness, bleeding. Severe pain or pain not relieved by medications.  Or, any other signs or symptoms that you may have questions about.    DISPOSITION:  x  Home With:   OT  PT  HH  RN       Long term SNF/Inpatient Rehab    Independent/assisted living    Hospice    Other:       PATIENT CONDITION AT DISCHARGE: Stable      PHYSICAL EXAMINATION AT DISCHARGE:  General:          Alert, cooperative, no distress, appears stated age.     HEENT:           Atraumatic, anicteric sclerae, pink conjunctivae                          No oral ulcers, mucosa moist, throat clear, dentition fair  Neck:               Supple, symmetrical  Lungs:             Clear to auscultation bilaterally.  No Wheezing or Rhonchi. No rales.  Chest wall:      No tenderness  No Accessory muscle use.  Heart:              Regular  rhythm,  No  murmur   No edema  Abdomen:        Soft, non-tender. Not distended.  Bowel sounds normal  Extremities:     No cyanosis.  No clubbing,                            Skin turgor normal, Capillary refill normal  Skin:                Not pale.  Not Jaundiced  No rashes   Psych:             Not anxious or agitated.  Neurologic:      Alert, moves all extremities, answers questions appropriately and responds to commands     Vascular duplex carotid bilateral   Final Result      MRI brain without contrast   Final Result   No significant abnormality or acute process.         Electronically signed by Liu Powers      CT HEAD WO CONTRAST   Final Result   No acute intracranial hemorrhage, mass or infarct.          Electronically signed by Amish Graandos MD           Recent Results (from the past 24 hour(s))   C-Reactive Protein    Collection Time: 09/26/24  5:08 AM   Result Value Ref Range    CRP <0.29 0.00 - 0.30 mg/dL   POCT Glucose    Collection Time: 09/26/24  7:46 AM   Result Value Ref Range    POC Glucose 79 65 - 100 mg/dL    Performed by: ANGIE CANO    Sedimentation Rate    Collection Time: 09/26/24

## 2024-09-26 NOTE — CARE COORDINATION
Patient discharging today going back to [facility]. Dc summary faxed.    Nurse to call report at [phone]    Transition of Care Plan:    RUR: 6%  Prior Level of Functioning: [facility]  Disposition: [facility]  If SNF or IPR: Date FOC offered: na  Date FOC received: na  Accepting facility: na  Date authorization started with reference number: na  Date authorization received and expires: na  Follow up appointments:   DME needed:   Transportation at discharge: guards  /IMM Medicare/ letter given: na  Is patient a  and connected with [facility]? na   If yes, was  transfer form completed and [facility] notified? na  Caregiver Contact: na  Discharge Caregiver contacted prior to discharge? na  Care Conference needed? na  Barriers to discharge: na

## 2024-09-26 NOTE — CARE COORDINATION
LOLIS received a phone call from [facility] regarding status change to OBS. [facility] indicated they perfect served attending. [facility] did not get a response.     Lolis called Dr. Sargent and notified him of Keron's request for status change to OBS.

## 2024-09-28 LAB
ANA SER QL: POSITIVE
C3 SERPL-MCNC: 123 MG/DL (ref 82–167)
C4 SERPL-MCNC: 34 MG/DL (ref 12–38)

## (undated) DEVICE — 3 ML SYRINGE WITH HYPODERMIC SAFETY NEEDLE: Brand: MONOJECT

## (undated) DEVICE — Device

## (undated) DEVICE — GUIDEWIRE VASC L150CM DIA0.035IN TIP L3MM PTFE J CRV FIX

## (undated) DEVICE — SURGICAL PROCEDURE TRAY CRD CATH 3 PRT

## (undated) DEVICE — CATHETER ETER VASC OD6FR CARD 145DEG PGTL BRAID JL40 JR40 MP

## (undated) DEVICE — 3M™ TEGADERM™ TRANSPARENT FILM DRESSING FRAME STYLE, 1626W, 4 IN X 4-3/4 IN (10 CM X 12 CM), 50/CT 4CT/CASE: Brand: 3M™ TEGADERM™

## (undated) DEVICE — CATH DIAG WR5 EXPO 5FRX100CM -- EXPO

## (undated) DEVICE — PINNACLE INTRODUCER SHEATH: Brand: PINNACLE